# Patient Record
Sex: FEMALE
[De-identification: names, ages, dates, MRNs, and addresses within clinical notes are randomized per-mention and may not be internally consistent; named-entity substitution may affect disease eponyms.]

---

## 2017-02-28 ENCOUNTER — HOSPITAL ENCOUNTER (OUTPATIENT)
Dept: HOSPITAL 53 - M LAB REF | Age: 82
End: 2017-02-28
Attending: INTERNAL MEDICINE
Payer: MEDICARE

## 2017-02-28 DIAGNOSIS — G43.909: ICD-10-CM

## 2017-02-28 DIAGNOSIS — G20: Primary | ICD-10-CM

## 2017-08-01 ENCOUNTER — HOSPITAL ENCOUNTER (OUTPATIENT)
Dept: HOSPITAL 53 - M LAB REF | Age: 82
End: 2017-08-01
Attending: INTERNAL MEDICINE
Payer: MEDICARE

## 2017-08-01 DIAGNOSIS — G20: Primary | ICD-10-CM

## 2017-08-04 LAB — PHENOBARBITAL (PRIMIDONE): 22 UG/ML (ref 15–40)

## 2017-12-06 ENCOUNTER — HOSPITAL ENCOUNTER (INPATIENT)
Dept: HOSPITAL 53 - M ED | Age: 82
LOS: 16 days | Discharge: NURSING, REHAB OR LONG-TERM CARE | DRG: 291 | End: 2017-12-22
Attending: INTERNAL MEDICINE | Admitting: HOSPITALIST
Payer: MEDICARE

## 2017-12-06 VITALS — DIASTOLIC BLOOD PRESSURE: 60 MMHG | SYSTOLIC BLOOD PRESSURE: 117 MMHG

## 2017-12-06 VITALS — BODY MASS INDEX: 37.58 KG/M2 | WEIGHT: 239.42 LBS | HEIGHT: 67 IN

## 2017-12-06 DIAGNOSIS — Z87.891: ICD-10-CM

## 2017-12-06 DIAGNOSIS — N39.0: ICD-10-CM

## 2017-12-06 DIAGNOSIS — G20: ICD-10-CM

## 2017-12-06 DIAGNOSIS — I08.0: ICD-10-CM

## 2017-12-06 DIAGNOSIS — A41.9: ICD-10-CM

## 2017-12-06 DIAGNOSIS — I50.33: Primary | ICD-10-CM

## 2017-12-06 DIAGNOSIS — M06.9: ICD-10-CM

## 2017-12-06 DIAGNOSIS — R53.1: ICD-10-CM

## 2017-12-06 DIAGNOSIS — Z79.899: ICD-10-CM

## 2017-12-06 DIAGNOSIS — Z79.52: ICD-10-CM

## 2017-12-06 DIAGNOSIS — M35.3: ICD-10-CM

## 2017-12-06 DIAGNOSIS — E78.5: ICD-10-CM

## 2017-12-06 DIAGNOSIS — K21.9: ICD-10-CM

## 2017-12-06 DIAGNOSIS — D72.829: ICD-10-CM

## 2017-12-06 DIAGNOSIS — E03.9: ICD-10-CM

## 2017-12-06 DIAGNOSIS — I48.91: ICD-10-CM

## 2017-12-06 DIAGNOSIS — J18.9: ICD-10-CM

## 2017-12-06 DIAGNOSIS — Z86.711: ICD-10-CM

## 2017-12-06 DIAGNOSIS — Z79.82: ICD-10-CM

## 2017-12-06 DIAGNOSIS — E87.2: ICD-10-CM

## 2017-12-06 DIAGNOSIS — I82.412: ICD-10-CM

## 2017-12-06 DIAGNOSIS — F32.9: ICD-10-CM

## 2017-12-06 DIAGNOSIS — R31.9: ICD-10-CM

## 2017-12-06 LAB
ANION GAP SERPL CALC-SCNC: 7 MEQ/L (ref 8–16)
BASE EXCESS BLDA CALC-SCNC: 4.6 MMOL/L (ref -2–2)
BASOPHILS # BLD AUTO: 0 10^3/UL (ref 0–0.2)
BASOPHILS NFR BLD AUTO: 0.3 % (ref 0–1)
BUN SERPL-MCNC: 25 MG/DL (ref 7–18)
CALCIUM SERPL-MCNC: 9 MG/DL (ref 8.8–10.2)
CHLORIDE SERPL-SCNC: 100 MEQ/L (ref 98–107)
CO2 BLDA CALC-SCNC: 27.9 MEQ/L (ref 23–31)
CO2 SERPL-SCNC: 34 MEQ/L (ref 21–32)
CREAT SERPL-MCNC: 1.07 MG/DL (ref 0.55–1.02)
EOSINOPHIL # BLD AUTO: 0 10^3/UL (ref 0–0.5)
EOSINOPHIL NFR BLD AUTO: 0.3 % (ref 0–3)
ERYTHROCYTE [DISTWIDTH] IN BLOOD BY AUTOMATED COUNT: 15.2 % (ref 11.5–14.5)
GFR SERPL CREATININE-BSD FRML MDRD: 51.6 ML/MIN/{1.73_M2} (ref 32–?)
GLUCOSE SERPL-MCNC: 105 MG/DL (ref 83–110)
HCO3 BLDA-SCNC: 26.9 MEQ/L (ref 22–26)
HCO3 STD BLDA-SCNC: 28.6 MEQ/L (ref 22–26)
IMM GRANULOCYTES NFR BLD: 0.4 % (ref 0–0)
INR PPP: 0.98
LYMPHOCYTES # BLD AUTO: 1.9 10^3/UL (ref 1.5–4.5)
LYMPHOCYTES NFR BLD AUTO: 24.3 % (ref 24–44)
MCH RBC QN AUTO: 36.1 PG (ref 27–33)
MCHC RBC AUTO-ENTMCNC: 34.6 G/DL (ref 32–36.5)
MCV RBC AUTO: 104.2 FL (ref 80–96)
MONOCYTES # BLD AUTO: 0.7 10^3/UL (ref 0–0.8)
MONOCYTES NFR BLD AUTO: 9.4 % (ref 0–5)
NEUTROPHILS # BLD AUTO: 5.1 10^3/UL (ref 1.8–7.7)
NEUTROPHILS NFR BLD AUTO: 65.3 % (ref 36–66)
NRBC BLD AUTO-RTO: 0 % (ref 0–0)
PCO2 BLDA: 33.1 MMHG (ref 35–45)
PH BLDA: 7.53 UNITS (ref 7.35–7.45)
PLATELET # BLD AUTO: 203 10^3/UL (ref 150–450)
PO2 BLDA: 77 MMHG (ref 75–100)
POTASSIUM SERPL-SCNC: 3.4 MEQ/L (ref 3.5–5.1)
SODIUM SERPL-SCNC: 141 MEQ/L (ref 136–145)
WBC # BLD AUTO: 7.9 10^3/UL (ref 4–10)

## 2017-12-06 RX ADMIN — FOLIC ACID SCH MG: 1 TABLET ORAL at 21:29

## 2017-12-06 RX ADMIN — FUROSEMIDE SCH MG: 10 INJECTION, SOLUTION INTRAMUSCULAR; INTRAVENOUS at 23:48

## 2017-12-06 RX ADMIN — PRIMIDONE SCH MG: 50 TABLET ORAL at 21:27

## 2017-12-06 RX ADMIN — CARBIDOPA AND LEVODOPA SCH TAB: 25; 100 TABLET ORAL at 21:27

## 2017-12-06 RX ADMIN — OXYBUTYNIN CHLORIDE SCH MG: 5 TABLET, EXTENDED RELEASE ORAL at 09:00

## 2017-12-06 RX ADMIN — CARBIDOPA AND LEVODOPA SCH TAB: 25; 100 TABLET ORAL at 21:00

## 2017-12-06 RX ADMIN — DOCUSATE SODIUM SCH MG: 100 CAPSULE, LIQUID FILLED ORAL at 21:30

## 2017-12-06 RX ADMIN — OMEPRAZOLE SCH MG: 20 CAPSULE, DELAYED RELEASE ORAL at 21:29

## 2017-12-06 RX ADMIN — MULTIPLE VITAMINS W/ MINERALS TAB SCH TAB: TAB at 21:25

## 2017-12-06 RX ADMIN — METOPROLOL SUCCINATE SCH MG: 25 TABLET, EXTENDED RELEASE ORAL at 21:29

## 2017-12-06 RX ADMIN — POTASSIUM CHLORIDE SCH MEQ: 750 TABLET, EXTENDED RELEASE ORAL at 21:30

## 2017-12-06 RX ADMIN — ATORVASTATIN CALCIUM SCH MG: 10 TABLET, FILM COATED ORAL at 21:27

## 2017-12-06 RX ADMIN — DIVALPROEX SODIUM SCH MG: 500 TABLET, FILM COATED, EXTENDED RELEASE ORAL at 21:28

## 2017-12-06 NOTE — ECGEPIP
Stationary ECG Study

                           [facility] - ED

                                       

                                       Test Date:    2017

Pat Name:     MINA VERA      Department:   

Patient ID:   [id number]                 Room:         -

Gender:       F                        Technician:   erick

:          1930               Requested By: DARRION HEART

Order Number: [id number]     Reading MD:   Ariel Chery

                                 Measurements

Intervals                              Axis          

Rate:         111                      P:            

OK:           0                        QRS:          -50

QRSD:         92                       T:            138

QT:           328                                    

QTc:          446                                    

                           Interpretive Statements

ATRIAL FIBRILLATION WITH RAPID VENTRICULAR RESPONSE

LEFT AXIS DEVIATION

LOW QRS VOLTAGE IN PRECORDIAL LEADS

NONSPECIFIC ST & T-WAVE ABNORMALITY

RHYTHM CHANGE COMPARED TO 6/3/14

 

Electronically Signed On 2017 19:02:22 EST by Ariel Chery

## 2017-12-06 NOTE — REP
CT angiogram chest:  12/05/2017

 

Comparison: 06/03/2014 CT angiogram, portable chest 12/06/2017.

 

Clinical history:  Dyspnea.

 

Technique:  The patient received a bolus of 75 mL Isovue 370 scanning through the

abdomen and pelvis with pulmonary angiogram protocol and with thick slab MIP

reformatting in coronal and sagittal reconstructions.

 

Findings:  Lung fields are adequately inflated.  There is some underlying

interstitial fibrotic change and minimal dependent atelectatic changes deep

sulcus lower lobes, left more than right and also along the major fissure in the

left mid lower lung zone.  There is curvilinear fibrotic change in the medial

segment of the right middle lobe and inferior lingular segment of the left upper

lobe.  No effusion or acute infiltrate.  There is very mild cylindrical

bronchiectatic change noted.  Heart is enlarged with left atrial and ventricular

enlargement.  There is no pericardial thickening or effusion and no hiatal

hernia.  There appears to be some thickening of the esophageal wall in the mid to

lower esophagus.  The abdominal and visible thoracic aorta included in the study

are without aneurysm or dissection.  There are atherosclerotic calcifications at

the arch.  The main, right and left pulmonary arteries and the mediastinum are

without filling defects.  The bilateral lobar and visible segmental arteries and

the subsegmental arteries included are also without filling defect or vessel

cutoff to suggest pulmonary emboli. There is  no pathologic mediastinal or hilar

adenopathy.  No axillary or supraclavicular mass.  Bones show the sternum,

manubrium, medial clavicles, the left AC joint, visualized small portions of the

left glenohumeral joint and the entire right as well, scapulae and ribs were

intact and without fracture or focal lesion.  There is a dextrorotatory curve

thoracolumbar junction with diffuse degenerative changes throughout the spine

without compression deformity or destructive lesion.  The upper abdomen that

portion of liver included was unremarkable.  That portion of spleen noted is

unremarkable and intact.  The small portions of the renal apices included were

intact and unremarkable.

 

Impression:

 

1.  There is no CT evidence of pulmonary thromboembolism.

 

2.  There is dependent atelectatic changes posteriorly in the lower lung zones

and some dependent atelectasis also along the major fissure on the left with

curvilinear fibroatelectatic change inferior lingular segment and medial segment

of the right middle lobe at the anterior lung base.  No effusion, dense

consolidation or parenchymal mass.

 

3.  No aortic aneurysm or dissection.  There are degenerative changes in the

spine.  Shoulders without destructive lesion or fracture.  No other significant

finding.

 

 

Signed by

Bill Fletcher MD 12/06/2017 07:44 P

## 2017-12-06 NOTE — REP
BILATERAL LOWER EXTREMITY DOPPLER VENOUS ULTRASOUND:  12/06/2017.

 

Clinical history:  Lower extremity edema.  Evaluate for DVT.  Prior history of

PE.  Not on anticoagulation.

 

Findings:  No comparison study.

 

Standard duplex techniques were utilized to evaluate the deep venous system from

the groin to the popliteal fossa.

 

On the left side there is nonocclusive thrombus in the femoral vein from the

takeoff of the profunda femoris to the popliteal vein in the knee.  There is

still respiratory variation and augmented flow.  Popliteal vein diminishes from

7.4 mm to 4.5 mm with compression.  Likewise the femoral vein from its distal to

its proximal course shows incomplete compression.  The common femoral vein was

unremarkable.

 

The right lower extremity shows full compressibility throughout its course with

respiratory variation, augmented flow and standard duplex Doppler

interrogation/color imaging.

 

Impression:

 

1.  Nonocclusive DVT in the left lower extremity from the takeoff of the profunda

femoris, through the course of the femoral vein in the thigh to the popliteal

vein.

 

2.  No DVT in the right lower extremity.

 

 

Signed by

Bill Fletcher MD 12/06/2017 07:41 P

## 2017-12-06 NOTE — HPEPDOC
[facility] Medical History & Physical


Date of Admission


Dec 6, 2017


Primary Care Physician:  ADAIR TOBIAS DO


Attending Physician:  SHAHRZAD POTTER MD





History and Physical


CHIEF COMPLAINT: shortness of breath and weakness





HISTORY OF PRESENT ILLNESS: [87-year-old female patient of Dr. Tobias presents 

to the emergency department with increasing weakness, shortness of breath, 

nonproductive cough for the last 10 days or so. She was found at home down on 

the floor for several hours by her family members. She felt that she had been 

down on the floor for approximately 9 hours and her life alert necklace had not 

been operating correctly. Her 2 daughters who are accompanying her at the 

emergency department today feel that she has had a  "wet sounding cough" for 

the last 10 days with little to no improvement. And they've noticed that she's 

had some lower extremity edema, which they feel is more than she normally has.





Dilation to the emergency department did not demonstrate any hypoxia. Her blood 

pressure was felt to be a little oh for her initially. She complained of some 

leg pain and was found to have a nonocclusive DVT on ultrasound and CT 

angiogram of the chest was negative for pulmonary embolism.





Nonetheless, she was felt to have an elevated proBNP and signs and symptoms of 

heart failure combined with her generalized weakness. As not felt to be safe to 

be discharged home. Hospitalist was contacted for admission.]





PAST MEDICAL HISTORY:


Atrial fibrillation


History of chronic diastolic heart failure. Last 2-D echoes in 2014


History of mitral valve and aortic valve regurgitation


Polymyalgia.


Hypothyroidism.


History of symptomatic ventricular ectopy.


History of parkinsonism.


Prior history of angioedema 1997, unknown cause.


Hyperlipidemia.


Rheumatoid arthritis


Prior history of pulmonary embolism, previously on Coumadin for anticoagulation 

resulting in a severe bleeding complication





PAST SURGICAL HISTORY:


Transvaginal hysterectomy.


Brain tumor with resection 1997 reported to be meningioma





SOCIAL HISTORY:


. Lives at home alone.


Prior history of smoking approximately one pack a day but quit smoking in 1999.


Denies alcohol use. No prior history of IV drug use.


Denies recent travel, no sick contacts





FAMILY HISTORY:


Noncontributory





ALLERGIES: Please see below.





REVIEW OF SYSTEMS:





CONSTITUTIONAL: No fever, chills, weight loss, nausea or vomiting  .


HEENT: No headache, lightheadedness, blurred or loss of vision. No difficulty 

with speech or swallow.


CARDIOVASCULAR: Positive orthopnea, paroxysmal nocturnal dyspnea and lower 

extremity edema. No chest pain, palpitations, 


RESPIRATORY: Nonproductive cough with wheeze, but no hemoptysis


GENITOURINARY: No dysuria, frequency, or discharge


MUSCULOSKELETAL: No bone, muscle or joint pain.


GASTROINTESTINAL: No Nausea, vomiting, change in appetite. Bowel movements are 

regular without hematochezia or melena. No bladder or bowel incontinence.


SKIN: No complaint of lesions, abrasions or rashes


NEUROLOGICAL: No blurred vision, headaches, paraesthesias or paralysis


PSYCHIATRIC: No depression, anxiety, audiovisual hallucinations. No suicidal 

ideations.


ENDOCRINE: Denies history of diabetes or thyroid disorder. No history of 

endocrine abnormalities.


HEMATOLOGIC/ONCOLOGY: prior history of VTE (PE), had a bleeding complication on 

coumadin.


LYMPHATIC: No lumps, bumps or swelling of neck, axilla or groin. No night 

sweats or weight loss.





HOME MEDICATIONS: Please see below. 





PHYSICAL EXAMINATION:


VITAL SIGNS: Please see below. 


GENERAL: NAD, A&OX3, Pleasant 


HEENT: PERRLA, throat clear, neck supple, unable to determine JVD


CARDIOVASCULAR: Irregular irregular


RESPIRATORY: Diminished bibasilar breath sounds with expiratory wheeze. 

Occasional Rales


ABDOMINAL: soft, NT/ND normoactive bowel sounds


EXTREMITIES: 1+ edema, no calf tenderness


NEUROLOGICAL: CN'S II-XII grossly intact


PSYCHOLOGICAL: negative


LABORATORY DATA: See below.





IMAGING: [


Extremities vascular ultrasound: Nonocclusive DVT of the left lower extremity 

from the profunda femoris through the femoral vein of the thigh to the 

popliteal vein]





CT angiogram of the chest:


1.  There is no CT evidence of pulmonary thromboembolism.


2.  There is dependent atelectatic changes posteriorly in the lower lung zones


and some dependent atelectasis also along the major fissure on the left with


curvilinear fibroatelectatic change inferior lingular segment and medial segment


of the right middle lobe at the anterior lung base.  No effusion, dense


consolidation or parenchymal mass.


3.  No aortic aneurysm or dissection.  There are degenerative changes in the


spine.  Shoulders without destructive lesion or fracture.  No other significant


finding.





Chest x-ray: Cardiomegaly with vascular cephalization but no pleural effusion 

noted.





Twelve-lead EKG atrial fibrillation with ventricular rate of 111, no specific ST

-T wave abnormality, no significant change from prior EKG.


 


MICROBIOLOGY: Please see below. 





IMPRESSION: 87-year-old female will need to be admitted for increased 

generalized weakness. She appears to be grossly volume overloaded at this point 

and will need further diuresing. She also appears to have some issues with 

physical deconditioning. Will need ongoing physical therapy and possibly 

nursing home placement.





PROBLEM LIST: 


1. Increased dyspnea, number next.


2. Weakness.


3. Rather large nonocclusive left lower extremity DVT


4. She was found down at home with no physical signs of trauma.


5. Congestive heart failure with diastolic dysfunction.(likely decompensated)


6. Lactic acidosis with no signs of infection or sepsis. This is likely related 

to muscle irritation from immobilization on the floor as indicated in the HPI.


7. Atrial fibrillation with mild RVR, possibly related to volume overload.


8. Parkinson's disease with physical deconditioning





PLAN: 


Admit MedSurg on telemetry. We'll continue with home meds (holding torsemide). 


Negative Lasix. Fluid restrict 1800 mL per day. 


I did start her on weight-based Lovenox. Regarding the nonocclusive lower 

extremity DVT. In further discussions with the family will need to be 

determined in the morning for possible IVC plate placement. The family is 

concerned that she is not a good candidate for oral anticoagulation therapy.


We'll continue on her home beta blocker for rapid ventricular rate. I'd 

anticipate her RVR will likely improve once her fluid status is improved.


PT, OT consults were placed. (She may be looking at nursing home vs. subacute 

rehab.


Her last echocardiogram was in 2014. We'll order a echo at this time for 

reevaluation.





DVT PROPHYLAXIS: Weight-based Lovenox





DISPOSITION: Likely greater than 2 midnights and will likely need nursing home 

placement








Vital Signs





Vital Signs








  Date Time  Temp Pulse Resp B/P (MAP) Pulse Ox O2 Delivery O2 Flow Rate FiO2


 


12/6/17 14:38    143/67 (92)    


 


12/6/17 14:30  119      


 


12/6/17 14:15     97   


 


12/6/17 11:46       2.0 


 


12/6/17 11:28 98.2  20   Room Air  











Laboratory Data


Labs 24H


Laboratory Tests 2


12/6/17 11:44: 


Immature Granulocyte % (Auto) 0.4H, White Blood Count 7.9, Red Blood Count 4.02

, Hemoglobin 14.5, Hematocrit 41.9, Mean Corpuscular Volume 104.2H, Mean 

Corpuscular Hemoglobin 36.1H, Mean Corpuscular Hemoglobin Concent 34.6, Red 

Cell Distribution Width 15.2H, Platelet Count 203, Neutrophils (%) (Auto) 65.3, 

Lymphocytes (%) (Auto) 24.3, Monocytes (%) (Auto) 9.4H, Eosinophils (%) (Auto) 

0.3, Basophils (%) (Auto) 0.3, Neutrophils # (Auto) 5.1, Lymphocytes # (Auto) 

1.9, Monocytes # (Auto) 0.7, Eosinophils # (Auto) 0.0, Basophils # (Auto) 0.0, 

Immature Granulocyte # (Auto) 0.0, Nucleated Red Blood Cells % (auto) 0.0, 

Anion Gap 7L, Glomerular Filtration Rate 51.6, Lactic Acid Level 2.4*H, Blood 

Urea Nitrogen 25H, Creatinine 1.07H, Sodium Level 141, Potassium Level 3.4L, 

Chloride Level 100, Carbon Dioxide Level 34H, Calcium Level 9.0, Total Creatine 

Kinase 121, Creatine Kinase MB 1.9, Creatine Kinase MB Relative Index 1.57, 

Troponin I < 0.02


12/6/17 11:48: 


Prothrombin Time 13.1, Prothromb Time International Ratio 0.98, Activated 

Partial Thromboplast Time 31.9, NT-Pro-B-Type Natriuretic Peptide 5468H, 

Thyroid Stimulating Hormone (TSH) 16.500H, Valproic Acid (Depakene) Level 27.6L


12/6/17 11:54: 


Blood Gas Bicarbonate Standard 28.6H, Arterial Blood pH 7.528H, Arterial Blood 

Partial Pressure CO2 33.1L, Arterial Blood Partial Pressure O2 77.0, Arterial 

Blood Total CO2 27.9, Arterial Blood HCO3 26.9H, Arterial Blood Base Excess 4.6H

, Arterial Blood Oxygen Saturation 96.0


12/6/17 16:34: 


12/6/17 16:36: Lactic Acid Followup at 4 Hours 1.9


CBC/BMP


Laboratory Tests


12/6/17 11:44








Red Blood Count 4.02, Mean Corpuscular Volume 104.2 H, Mean Corpuscular 

Hemoglobin 36.1 H, Mean Corpuscular Hemoglobin Concent 34.6, Red Cell 

Distribution Width 15.2 H, Neutrophils (%) (Auto) 65.3, Lymphocytes (%) (Auto) 

24.3, Monocytes (%) (Auto) 9.4 H, Eosinophils (%) (Auto) 0.3, Basophils (%) (

Auto) 0.3, Neutrophils # (Auto) 5.1, Lymphocytes # (Auto) 1.9, Monocytes # (Auto

) 0.7, Eosinophils # (Auto) 0.0, Basophils # (Auto) 0.0, Calcium Level 9.0, 

Total Creatine Kinase 121


Microbiology





Microbiology


12/6/17 Blood Culture, Received


          Pending


12/6/17 Blood Culture, Received


          Pending





Home Medications


Scheduled


Aspirin (Aspirin EC) 325 Mg Tabec, 325 MG PO DAILY


Atorvastatin Calcium (Lipitor) 10 Mg Tab, 10 MG PO QHS


Carbidopa/Levodopa (Sinemet  mg) 1 Tab Tab, 1.5 TAB PO QID


   WITH MEALS AND AT BEDTIME 


Divalproex Sodium (Divalproex Sodium ER) 500 Mg Tab, 500 MG PO BID


Ferrous Sulfate (Ferrous Sulfate) 325 Mg Tab, 325 MG PO DAILY


Folic Acid (Folic Acid) 1 Mg Tab, 1 MG PO QHS


Levothyroxine Sodium (Levoxyl) 150 Mcg Tab, 150 MCG PO DAILY


Loratadine (Loratadine) 10 Mg Tab, 10 MG PO DAILY


Methotrexate (Methotrexate) 2.5 Mg Tab, 12.5 MG PO ASDIRECTED


   ONCE WEEKLY ON WEDNESDAYS 


Metoprolol Succinate (Metoprolol Succinate ER) 25 Mg Tab, 25 MG PO BID


Multivitamins (Centrum Silver) 1 Tab Tab, 1 TAB PO DAILY


Omeprazole (Prilosec) 20 Mg Cap, 20 MG PO BID


Oxybutynin Chloride (Ditropan) 5 Mg Tab, 5 MG PO DAILY


Paroxetine (Paxil) 20 Mg Tab, 20 MG PO DAILY


Potassium Chloride (Potassium Chloride ER) 10 Meq Tab, 10 MEQ PO BID


Prednisone (Prednisone) 1 Mg Tab, 1 MG PO BID


Primidone (Mysoline) 50 Mg Tab, 200 MG PO DAILY


Primidone (Primidone) 50 Mg Tab, 150 MG PO QHS


Torsemide (Torsemide) 20 Mg Tab, 40 MG PO BID





Scheduled PRN


Carboxymethylcellulose Sodium (Refresh Tears) 0.5 % Gallo, 1 DROP OU PRN PRN for 

DRY EYES


Levalbuterol Tartrate (Xopenex Hfa) 45 Mcg/Act Aer, 1 PUFF INH QID PRN for 

SHORTNESS OF BREATH





Allergies


Coded Allergies:  


     No Known Drug Allergy (Unverified  Allergy, Unknown, 4/5/13)











YANY CLARK DO Dec 6, 2017 17:26

## 2017-12-06 NOTE — REP
Chest two views

 

HISTORY: Cough

 

Comparison: 06/03/2014

 

The lungs are clear.  The cardiac silhouette is enlarged.  The pulmonary

vasculature is normal in appearance.  The bony structure is intact.

 

IMPRESSION: Cardiomegaly.

 

 

Signed by

Sarwat Serna MD 12/06/2017 12:25 P

## 2017-12-07 VITALS — SYSTOLIC BLOOD PRESSURE: 129 MMHG | DIASTOLIC BLOOD PRESSURE: 60 MMHG

## 2017-12-07 VITALS — SYSTOLIC BLOOD PRESSURE: 123 MMHG | DIASTOLIC BLOOD PRESSURE: 60 MMHG

## 2017-12-07 VITALS — SYSTOLIC BLOOD PRESSURE: 121 MMHG | DIASTOLIC BLOOD PRESSURE: 76 MMHG

## 2017-12-07 VITALS — DIASTOLIC BLOOD PRESSURE: 76 MMHG | SYSTOLIC BLOOD PRESSURE: 121 MMHG

## 2017-12-07 VITALS — DIASTOLIC BLOOD PRESSURE: 56 MMHG | SYSTOLIC BLOOD PRESSURE: 112 MMHG

## 2017-12-07 VITALS — DIASTOLIC BLOOD PRESSURE: 87 MMHG | SYSTOLIC BLOOD PRESSURE: 116 MMHG

## 2017-12-07 VITALS — SYSTOLIC BLOOD PRESSURE: 114 MMHG | DIASTOLIC BLOOD PRESSURE: 68 MMHG

## 2017-12-07 LAB
ANION GAP SERPL CALC-SCNC: 10 MEQ/L (ref 8–16)
BUN SERPL-MCNC: 18 MG/DL (ref 7–18)
CALCIUM SERPL-MCNC: 8.2 MG/DL (ref 8.8–10.2)
CHLORIDE SERPL-SCNC: 103 MEQ/L (ref 98–107)
CO2 SERPL-SCNC: 30 MEQ/L (ref 21–32)
CREAT SERPL-MCNC: 0.9 MG/DL (ref 0.55–1.02)
ERYTHROCYTE [DISTWIDTH] IN BLOOD BY AUTOMATED COUNT: 15.1 % (ref 11.5–14.5)
GFR SERPL CREATININE-BSD FRML MDRD: > 60 ML/MIN/{1.73_M2} (ref 32–?)
GLUCOSE SERPL-MCNC: 132 MG/DL (ref 83–110)
MCH RBC QN AUTO: 35.3 PG (ref 27–33)
MCHC RBC AUTO-ENTMCNC: 33.8 G/DL (ref 32–36.5)
MCV RBC AUTO: 104.6 FL (ref 80–96)
NRBC BLD AUTO-RTO: 0 % (ref 0–0)
PLATELET # BLD AUTO: 204 10^3/UL (ref 150–450)
POTASSIUM SERPL-SCNC: 3.5 MEQ/L (ref 3.5–5.1)
SODIUM SERPL-SCNC: 143 MEQ/L (ref 136–145)
WBC # BLD AUTO: 5.8 10^3/UL (ref 4–10)

## 2017-12-07 RX ADMIN — OXYBUTYNIN CHLORIDE SCH MG: 5 TABLET, EXTENDED RELEASE ORAL at 10:21

## 2017-12-07 RX ADMIN — CARBIDOPA AND LEVODOPA SCH TAB: 25; 100 TABLET ORAL at 13:34

## 2017-12-07 RX ADMIN — OMEPRAZOLE SCH MG: 20 CAPSULE, DELAYED RELEASE ORAL at 20:53

## 2017-12-07 RX ADMIN — LORATADINE SCH MG: 10 TABLET ORAL at 10:22

## 2017-12-07 RX ADMIN — CARBIDOPA AND LEVODOPA SCH TAB: 25; 100 TABLET ORAL at 10:22

## 2017-12-07 RX ADMIN — FUROSEMIDE SCH MG: 10 INJECTION, SOLUTION INTRAMUSCULAR; INTRAVENOUS at 17:50

## 2017-12-07 RX ADMIN — OMEPRAZOLE SCH MG: 20 CAPSULE, DELAYED RELEASE ORAL at 10:21

## 2017-12-07 RX ADMIN — FERROUS SULFATE TAB 325 MG (65 MG ELEMENTAL FE) SCH MG: 325 (65 FE) TAB at 10:21

## 2017-12-07 RX ADMIN — PRIMIDONE SCH MG: 50 TABLET ORAL at 10:20

## 2017-12-07 RX ADMIN — PRIMIDONE SCH MG: 50 TABLET ORAL at 20:53

## 2017-12-07 RX ADMIN — CARBIDOPA AND LEVODOPA SCH TAB: 25; 100 TABLET ORAL at 17:50

## 2017-12-07 RX ADMIN — DIVALPROEX SODIUM SCH MG: 500 TABLET, FILM COATED, EXTENDED RELEASE ORAL at 20:52

## 2017-12-07 RX ADMIN — APIXABAN SCH MG: 2.5 TABLET, FILM COATED ORAL at 10:29

## 2017-12-07 RX ADMIN — MULTIPLE VITAMINS W/ MINERALS TAB SCH TAB: TAB at 10:23

## 2017-12-07 RX ADMIN — FOLIC ACID SCH MG: 1 TABLET ORAL at 20:54

## 2017-12-07 RX ADMIN — FUROSEMIDE SCH MG: 10 INJECTION, SOLUTION INTRAMUSCULAR; INTRAVENOUS at 12:39

## 2017-12-07 RX ADMIN — METOPROLOL SUCCINATE SCH MG: 25 TABLET, EXTENDED RELEASE ORAL at 20:55

## 2017-12-07 RX ADMIN — FUROSEMIDE SCH MG: 10 INJECTION, SOLUTION INTRAMUSCULAR; INTRAVENOUS at 05:34

## 2017-12-07 RX ADMIN — ATORVASTATIN CALCIUM SCH MG: 10 TABLET, FILM COATED ORAL at 20:52

## 2017-12-07 RX ADMIN — DOCUSATE SODIUM SCH MG: 100 CAPSULE, LIQUID FILLED ORAL at 10:21

## 2017-12-07 RX ADMIN — DIVALPROEX SODIUM SCH MG: 500 TABLET, FILM COATED, EXTENDED RELEASE ORAL at 10:21

## 2017-12-07 RX ADMIN — LEVOTHYROXINE SODIUM SCH MCG: 150 TABLET ORAL at 05:34

## 2017-12-07 RX ADMIN — ASPIRIN SCH MG: 325 TABLET, COATED ORAL at 10:21

## 2017-12-07 RX ADMIN — POTASSIUM CHLORIDE SCH MEQ: 750 TABLET, EXTENDED RELEASE ORAL at 10:22

## 2017-12-07 RX ADMIN — POTASSIUM CHLORIDE SCH MEQ: 750 TABLET, EXTENDED RELEASE ORAL at 20:52

## 2017-12-07 RX ADMIN — METOPROLOL SUCCINATE SCH MG: 25 TABLET, EXTENDED RELEASE ORAL at 10:22

## 2017-12-07 RX ADMIN — DOCUSATE SODIUM SCH MG: 100 CAPSULE, LIQUID FILLED ORAL at 20:54

## 2017-12-07 RX ADMIN — APIXABAN SCH MG: 2.5 TABLET, FILM COATED ORAL at 20:54

## 2017-12-07 RX ADMIN — CARBIDOPA AND LEVODOPA SCH TAB: 25; 100 TABLET ORAL at 20:54

## 2017-12-07 NOTE — ECHO
DATE OF PROCEDURE: 12/07/2017

 

REFERRING PHYSICIAN: Dr. Rios Valente.

 

INDICATION: Congestive heart failure.

 

HEIGHT: 170 cm

WEIGHT: 111 kg

 

DIMENSIONS:

IVS: 1.2

LV 4.1

LVPW: 1.1

LA 4.2

Aorta 3.4.

 

FINDINGS: Study is of very limited technical quality with limited visualization.

Left ventricle is of normal size and grossly normal systolic function. I

certainly cannot rule out subtle wall motion abnormalities.  Right ventricle was

poorly seen but grossly appears normal.  Both atria are at least mildly 
enlarged.

Aortic valve appears normal.  Mitral valve also appears normal based on limited

views.  Tricuspid valve was poorly seen but no gross abnormalities noted.

Pulmonic valve was not visualized.  No pericardial effusion is present.  
Inferior

vena cava was not seen.  Aortic root is normal.  Aortic arch and abdominal aorta

were not seen.  There is a pericardial fat pad noted.

 

Doppler interrogation reveals no aortic stenosis and trace aortic insufficiency.

There is trace mitral insufficiency and trace tricuspid insufficiency.

Unfortunately quality of TR jet was not sufficient to adequately estimate

pulmonary artery pressure.

 

Evaluation of diastolic function is inconclusive due to underlying atrial

fibrillation with mildly tachycardic response.

 

CONCLUSIONS:

1.  Study is of very limited technical quality.

2.  Normal LV size with borderline LVH and grossly preserved LV systolic

function.

3.  No significant valvular disease.

4.  Unable to estimate central venous pressure and pulmonary artery pressure.

 

COMMENT: Subacute bacterial endocarditis (SBE) prophylaxis not recommended.

MTDD

## 2017-12-07 NOTE — IPNPDOC
Date Seen


The patient was seen on 12/7/17.





Progress Note


SUBJECTIVE: Patient is a 86 yo female, admitted for dyspnea and weakness felt 

to be related to volume overload and underlying history of CHF.


No overnight issues. Feels breathing is better. Denies: F/C/R, N/V/D, CP.





OBJECTIVE


PHYSICAL EXAMINATION:


VITAL SIGNS: Please see below. 


GENERAL: NAD, A&OX3, Pleasant 


HEENT: PERRLA, throat clear, neck supple,unable to evaluate JVD


CARDIOVASCULAR: irregular irregular


RESPIRATORY: rales and expiratory wheeze noted bilaterally


ABDOMINAL: soft, NT/ND normoactive bowel sounds


EXTREMITIES: 1+ edema/no calf tenderness


NEUROLOGICAL: CN'S II-XII grossly intact


PSYCHOLOGICAL: negative





LABORATORY DATA: Please see below.





MICROBIOLOGY: Please see below.





Echocardiogram: pending.





DVT prophylaxis ordered?: yes / Eliquis 





ASSESSMENT AND PLAN: This is a -year-old RACE GENDER with .





PROBLEMS:


1. Increased dyspnea on admission: 


Subjectively better, likely related to decompensated CHF. 


Continue to diurese.


2. Weakness: 


Overall deconditioned.


PT/OT eval and treatment pending.


3. Rather large nonocclusive left lower extremity DVT:


Patient and family concerned about prior issue with coumadin but willing to try 

Eliquis.


Will need treatment for at least 3 months and defer further anticoagulation to 

Dr. Parra (PCP)


4. She was found down at home with no physical signs of trauma:


Continue with PT/OT.


5. Congestive heart failure with diastolic dysfunction.(likely decompensated):


Fluid restriction to 1800cc/day


Net negative lasix to 2L/day


Will try to titrate O2 supplementation.


6. Lactic acidosis with no signs of infection or sepsis: 


Resolved.


7. Atrial fibrillation with mild RVR, possibly related to volume overload:


Rate is better controlled with diuresis. 


Discussed [id number] score of 6 and need for anticoagulation.


Agreeable to trial of Eliquis while being treated for DVT.


Will defer further discussion to PCP at outpatient follow up.


8. Parkinson's disease with physical deconditioning: 


PT/OT and PFS consult 





DISPOSITION: continue to diurese, needs to work with PT/OT and look at possible 

rehab versus placement..





VS, I&O, 24H, Fishbone


Vital Signs/I&O





Vital Signs








  Date Time  Temp Pulse Resp B/P (MAP) Pulse Ox O2 Delivery O2 Flow Rate FiO2


 


12/7/17 07:16       3.0 


 


12/7/17 06:00 97.3 56 19 123/60 (81) 94 Nasal Cannula  











Laboratory Data


24H LABS


Laboratory Tests 2


12/6/17 11:44: 


Immature Granulocyte % (Auto) 0.4H, White Blood Count 7.9, Red Blood Count 4.02

, Hemoglobin 14.5, Hematocrit 41.9, Mean Corpuscular Volume 104.2H, Mean 

Corpuscular Hemoglobin 36.1H, Mean Corpuscular Hemoglobin Concent 34.6, Red 

Cell Distribution Width 15.2H, Platelet Count 203, Neutrophils (%) (Auto) 65.3, 

Lymphocytes (%) (Auto) 24.3, Monocytes (%) (Auto) 9.4H, Eosinophils (%) (Auto) 

0.3, Basophils (%) (Auto) 0.3, Neutrophils # (Auto) 5.1, Lymphocytes # (Auto) 

1.9, Monocytes # (Auto) 0.7, Eosinophils # (Auto) 0.0, Basophils # (Auto) 0.0, 

Immature Granulocyte # (Auto) 0.0, Nucleated Red Blood Cells % (auto) 0.0, 

Anion Gap 7L, Glomerular Filtration Rate 51.6, Lactic Acid Level 2.4*H, Blood 

Urea Nitrogen 25H, Creatinine 1.07H, Sodium Level 141, Potassium Level 3.4L, 

Chloride Level 100, Carbon Dioxide Level 34H, Calcium Level 9.0, Total Creatine 

Kinase 121, Creatine Kinase MB 1.9, Creatine Kinase MB Relative Index 1.57, 

Troponin I < 0.02


12/6/17 11:48: 


Prothrombin Time 13.1, Prothromb Time International Ratio 0.98, Activated 

Partial Thromboplast Time 31.9, NT-Pro-B-Type Natriuretic Peptide 5468H, 

Thyroid Stimulating Hormone (TSH) 16.500H, Valproic Acid (Depakene) Level 27.6L


12/6/17 11:54: 


Blood Gas Bicarbonate Standard 28.6H, Arterial Blood pH 7.528H, Arterial Blood 

Partial Pressure CO2 33.1L, Arterial Blood Partial Pressure O2 77.0, Arterial 

Blood Total CO2 27.9, Arterial Blood HCO3 26.9H, Arterial Blood Base Excess 4.6H

, Arterial Blood Oxygen Saturation 96.0


12/6/17 16:34: 


12/6/17 16:36: Lactic Acid Followup at 4 Hours 1.9


12/7/17 07:05: 


Nucleated Red Blood Cells % (auto) 0.0, Anion Gap 10, Glomerular Filtration 

Rate > 60.0, Blood Urea Nitrogen 18, Creatinine 0.90, Sodium Level 143, 

Potassium Level 3.5, Chloride Level 103, Carbon Dioxide Level 30, Calcium Level 

8.2L


CBC/BMP


Laboratory Tests


12/6/17 11:44








Red Blood Count 4.02, Mean Corpuscular Volume 104.2 H, Mean Corpuscular 

Hemoglobin 36.1 H, Mean Corpuscular Hemoglobin Concent 34.6, Red Cell 

Distribution Width 15.2 H, Neutrophils (%) (Auto) 65.3, Lymphocytes (%) (Auto) 

24.3, Monocytes (%) (Auto) 9.4 H, Eosinophils (%) (Auto) 0.3, Basophils (%) (

Auto) 0.3, Neutrophils # (Auto) 5.1, Lymphocytes # (Auto) 1.9, Monocytes # (Auto

) 0.7, Eosinophils # (Auto) 0.0, Basophils # (Auto) 0.0, Calcium Level 9.0, 

Total Creatine Kinase 121





12/7/17 07:05








Red Blood Count 3.71 L, Mean Corpuscular Volume 104.6 H, Mean Corpuscular 

Hemoglobin 35.3 H, Mean Corpuscular Hemoglobin Concent 33.8, Red Cell 

Distribution Width 15.1 H, Calcium Level 8.2 L


Microbiology





Microbiology


12/6/17 Blood Culture, Received


          Pending


12/6/17 Blood Culture, Received


          Pending











YANY CLARK DO Dec 7, 2017 10:13

## 2017-12-08 VITALS — SYSTOLIC BLOOD PRESSURE: 114 MMHG | DIASTOLIC BLOOD PRESSURE: 60 MMHG

## 2017-12-08 VITALS — DIASTOLIC BLOOD PRESSURE: 81 MMHG | SYSTOLIC BLOOD PRESSURE: 148 MMHG

## 2017-12-08 VITALS — DIASTOLIC BLOOD PRESSURE: 61 MMHG | SYSTOLIC BLOOD PRESSURE: 146 MMHG

## 2017-12-08 VITALS — SYSTOLIC BLOOD PRESSURE: 135 MMHG | DIASTOLIC BLOOD PRESSURE: 75 MMHG

## 2017-12-08 VITALS — SYSTOLIC BLOOD PRESSURE: 117 MMHG | DIASTOLIC BLOOD PRESSURE: 54 MMHG

## 2017-12-08 LAB
ANION GAP SERPL CALC-SCNC: 6 MEQ/L (ref 8–16)
BUN SERPL-MCNC: 18 MG/DL (ref 7–18)
CALCIUM SERPL-MCNC: 8.2 MG/DL (ref 8.8–10.2)
CHLORIDE SERPL-SCNC: 102 MEQ/L (ref 98–107)
CO2 SERPL-SCNC: 35 MEQ/L (ref 21–32)
CREAT SERPL-MCNC: 0.84 MG/DL (ref 0.55–1.02)
ERYTHROCYTE [DISTWIDTH] IN BLOOD BY AUTOMATED COUNT: 15 % (ref 11.5–14.5)
GFR SERPL CREATININE-BSD FRML MDRD: > 60 ML/MIN/{1.73_M2} (ref 32–?)
GLUCOSE SERPL-MCNC: 115 MG/DL (ref 83–110)
MCH RBC QN AUTO: 35.2 PG (ref 27–33)
MCHC RBC AUTO-ENTMCNC: 33.8 G/DL (ref 32–36.5)
MCV RBC AUTO: 104.2 FL (ref 80–96)
NRBC BLD AUTO-RTO: 0.4 % (ref 0–0)
PLATELET # BLD AUTO: 222 10^3/UL (ref 150–450)
POTASSIUM SERPL-SCNC: 3.4 MEQ/L (ref 3.5–5.1)
SODIUM SERPL-SCNC: 143 MEQ/L (ref 136–145)
WBC # BLD AUTO: 9.6 10^3/UL (ref 4–10)

## 2017-12-08 RX ADMIN — POTASSIUM CHLORIDE SCH MEQ: 750 TABLET, EXTENDED RELEASE ORAL at 09:20

## 2017-12-08 RX ADMIN — APIXABAN SCH MG: 2.5 TABLET, FILM COATED ORAL at 09:19

## 2017-12-08 RX ADMIN — CARBIDOPA AND LEVODOPA SCH TAB: 25; 100 TABLET ORAL at 13:02

## 2017-12-08 RX ADMIN — METOPROLOL SUCCINATE SCH MG: 25 TABLET, EXTENDED RELEASE ORAL at 21:54

## 2017-12-08 RX ADMIN — POTASSIUM CHLORIDE SCH MEQ: 750 TABLET, EXTENDED RELEASE ORAL at 21:54

## 2017-12-08 RX ADMIN — FUROSEMIDE SCH MG: 10 INJECTION, SOLUTION INTRAMUSCULAR; INTRAVENOUS at 06:29

## 2017-12-08 RX ADMIN — ATORVASTATIN CALCIUM SCH MG: 10 TABLET, FILM COATED ORAL at 21:52

## 2017-12-08 RX ADMIN — MULTIPLE VITAMINS W/ MINERALS TAB SCH TAB: TAB at 09:20

## 2017-12-08 RX ADMIN — FERROUS SULFATE TAB 325 MG (65 MG ELEMENTAL FE) SCH MG: 325 (65 FE) TAB at 09:20

## 2017-12-08 RX ADMIN — OMEPRAZOLE SCH MG: 20 CAPSULE, DELAYED RELEASE ORAL at 21:52

## 2017-12-08 RX ADMIN — LEVOTHYROXINE SODIUM SCH MCG: 150 TABLET ORAL at 06:29

## 2017-12-08 RX ADMIN — OMEPRAZOLE SCH MG: 20 CAPSULE, DELAYED RELEASE ORAL at 09:20

## 2017-12-08 RX ADMIN — DIVALPROEX SODIUM SCH MG: 500 TABLET, FILM COATED, EXTENDED RELEASE ORAL at 09:19

## 2017-12-08 RX ADMIN — CARBIDOPA AND LEVODOPA SCH TAB: 25; 100 TABLET ORAL at 21:52

## 2017-12-08 RX ADMIN — OXYBUTYNIN CHLORIDE SCH MG: 5 TABLET, EXTENDED RELEASE ORAL at 09:20

## 2017-12-08 RX ADMIN — METOPROLOL SUCCINATE SCH MG: 25 TABLET, EXTENDED RELEASE ORAL at 09:19

## 2017-12-08 RX ADMIN — CARBIDOPA AND LEVODOPA SCH TAB: 25; 100 TABLET ORAL at 17:25

## 2017-12-08 RX ADMIN — PRIMIDONE SCH MG: 50 TABLET ORAL at 21:53

## 2017-12-08 RX ADMIN — DIVALPROEX SODIUM SCH MG: 500 TABLET, FILM COATED, EXTENDED RELEASE ORAL at 21:53

## 2017-12-08 RX ADMIN — FUROSEMIDE SCH MG: 10 INJECTION, SOLUTION INTRAMUSCULAR; INTRAVENOUS at 00:18

## 2017-12-08 RX ADMIN — FOLIC ACID SCH MG: 1 TABLET ORAL at 21:52

## 2017-12-08 RX ADMIN — PRIMIDONE SCH MG: 50 TABLET ORAL at 09:19

## 2017-12-08 RX ADMIN — CARBIDOPA AND LEVODOPA SCH TAB: 25; 100 TABLET ORAL at 09:20

## 2017-12-08 RX ADMIN — DOCUSATE SODIUM SCH MG: 100 CAPSULE, LIQUID FILLED ORAL at 21:52

## 2017-12-08 RX ADMIN — LORATADINE SCH MG: 10 TABLET ORAL at 09:19

## 2017-12-08 RX ADMIN — DOCUSATE SODIUM SCH MG: 100 CAPSULE, LIQUID FILLED ORAL at 09:19

## 2017-12-08 RX ADMIN — ASPIRIN SCH MG: 325 TABLET, COATED ORAL at 09:20

## 2017-12-08 RX ADMIN — APIXABAN SCH MG: 2.5 TABLET, FILM COATED ORAL at 21:52

## 2017-12-08 NOTE — IPNPDOC
Text Note


Date of Service


The patient was seen on 12/8/17.





NOTE


Subjective:


Patient is an 87 year old  female with a PMHx of A. fib, Diastolic CHF

, MV & AV Regurgitation, Ventricular ectopy, Parkinson's, PE (previously on 

Coumadin, with bleeding complication), DLP, Hypothyroidism, Polymyalgia, RA, 

who presented to the ER with SOB and non-productive cough for 10 days. She was 

found down at home and brought into the ER. 





She was found to have a non-occlusive DVT and a negative workup for PE. She has 

been started on Eliquis. She appeared to have signs of fluid overload and 

started on IV diuretics. 





Patient was seen and examined at the bedside. She denies any chest pain or 

palpitations, reports improvement in her breathing. 





Objective:


Vitals (See below)


General: Lying in bed, no acute distress, comfortable, AAOx3


HEENT: NC, AT


CVS: RRR, +S1S2


Lungs: Fair air entry b/l, -w/r/r


Abdomen: Soft, ND, NT


Extremities: Diffuse edema - non-pitting, - Calf tenderness





Assessment and plan:


Dyspnea - likely 2/2 fluid overload - likely 2/2 decompensated diastolic CHF


- Presented to the ER with signs of fluid overload


- Physical currently reveals non-pitting edema, no appreciable crackles; 

difficult to assess volume status


- proBNP elevated from admission


- CTA Chest 12/6: no PE, dependent atelectasis noted


- c/w Diuresis with Lasix 40 IV q6h; with holding parameters


- c/w Strict I/Os, Daily weights and head of bed elevation


- Titrate off supplemental oxygen 





Non-occlusive Left LE DVT


- Risks and benefits of anticoagulation were discussed with family


- c/w Eliquis for minimum of 3 months


- Dr. Parra (PCP) will continue to manage as an outpatient 





Hypokalemia


- s/p supplementation 





Weakness - likely 2/2 deconditioning


- c/w Physical therapy 





s/p Lactic acidosis


- No evidence of infection


- Hold off on antibiotics





A. fib with episode of RVR


- currently rate controlled


- off telemetry


- c/w metoprolol succinate for rate control


- on full anticoagulation with Eliquis


- Will reduce dose of ASA to 81





Parkinson's


- c/w Carbidopa / Levodopa 


- c/w Primidone 





Hx of PE


- previously on Coumadin, with bleeding complication





DLP


- c/w Atorvastatin 





Hypothyroidism


- c/w Levothyroxine 





Polymyalgia / RA


- c/w Prednisone 





Depression


- c/w Paroxetine 





GERD


- c/w Omeprazole





DVT prophylaxis


- on full anticoagulation with Eliquis





VS,Fishbone, I+O


VS, Fishbone, I+O


Laboratory Tests


12/8/17 05:43








Red Blood Count 3.78 L, Mean Corpuscular Volume 104.2 H, Mean Corpuscular 

Hemoglobin 35.2 H, Mean Corpuscular Hemoglobin Concent 33.8, Red Cell 

Distribution Width 15.0 H, Calcium Level 8.2 L








Vital Signs








  Date Time  Temp Pulse Resp B/P (MAP) Pulse Ox O2 Delivery O2 Flow Rate FiO2


 


12/8/17 10:00 98.0 103 20 135/75 (95) 94 Nasal Cannula 1.0 














I&O- Last 24 Hours up to 6 AM 


 


 12/9/17





 06:00


 


Intake Total 120 ml


 


Output Total 400 ml


 


Balance -280 ml

















SHAHRZAD POTTER MD Dec 8, 2017 13:44

## 2017-12-09 VITALS — DIASTOLIC BLOOD PRESSURE: 60 MMHG | SYSTOLIC BLOOD PRESSURE: 114 MMHG

## 2017-12-09 VITALS — DIASTOLIC BLOOD PRESSURE: 71 MMHG | SYSTOLIC BLOOD PRESSURE: 120 MMHG

## 2017-12-09 VITALS — DIASTOLIC BLOOD PRESSURE: 78 MMHG | SYSTOLIC BLOOD PRESSURE: 137 MMHG

## 2017-12-09 VITALS — SYSTOLIC BLOOD PRESSURE: 133 MMHG | DIASTOLIC BLOOD PRESSURE: 71 MMHG

## 2017-12-09 VITALS — DIASTOLIC BLOOD PRESSURE: 60 MMHG | SYSTOLIC BLOOD PRESSURE: 106 MMHG

## 2017-12-09 VITALS — DIASTOLIC BLOOD PRESSURE: 66 MMHG | SYSTOLIC BLOOD PRESSURE: 126 MMHG

## 2017-12-09 LAB
ANION GAP SERPL CALC-SCNC: 7 MEQ/L (ref 8–16)
BUN SERPL-MCNC: 22 MG/DL (ref 7–18)
CALCIUM SERPL-MCNC: 8.9 MG/DL (ref 8.8–10.2)
CHLORIDE SERPL-SCNC: 101 MEQ/L (ref 98–107)
CO2 SERPL-SCNC: 32 MEQ/L (ref 21–32)
CREAT SERPL-MCNC: 1.08 MG/DL (ref 0.55–1.02)
ERYTHROCYTE [DISTWIDTH] IN BLOOD BY AUTOMATED COUNT: 15.3 % (ref 11.5–14.5)
GFR SERPL CREATININE-BSD FRML MDRD: 51.1 ML/MIN/{1.73_M2} (ref 32–?)
GLUCOSE SERPL-MCNC: 106 MG/DL (ref 83–110)
MCH RBC QN AUTO: 35.4 PG (ref 27–33)
MCHC RBC AUTO-ENTMCNC: 33.6 G/DL (ref 32–36.5)
MCV RBC AUTO: 105.5 FL (ref 80–96)
NRBC BLD AUTO-RTO: 0.1 % (ref 0–0)
PLATELET # BLD AUTO: 231 10^3/UL (ref 150–450)
POTASSIUM SERPL-SCNC: 3.8 MEQ/L (ref 3.5–5.1)
SODIUM SERPL-SCNC: 140 MEQ/L (ref 136–145)
WBC # BLD AUTO: 13.7 10^3/UL (ref 4–10)

## 2017-12-09 RX ADMIN — METOPROLOL SUCCINATE SCH MG: 25 TABLET, EXTENDED RELEASE ORAL at 09:57

## 2017-12-09 RX ADMIN — FOLIC ACID SCH MG: 1 TABLET ORAL at 21:04

## 2017-12-09 RX ADMIN — DIVALPROEX SODIUM SCH MG: 500 TABLET, FILM COATED, EXTENDED RELEASE ORAL at 21:04

## 2017-12-09 RX ADMIN — APIXABAN SCH MG: 2.5 TABLET, FILM COATED ORAL at 21:07

## 2017-12-09 RX ADMIN — DOCUSATE SODIUM SCH MG: 100 CAPSULE, LIQUID FILLED ORAL at 21:04

## 2017-12-09 RX ADMIN — OMEPRAZOLE SCH MG: 20 CAPSULE, DELAYED RELEASE ORAL at 09:50

## 2017-12-09 RX ADMIN — DOCUSATE SODIUM SCH MG: 100 CAPSULE, LIQUID FILLED ORAL at 09:50

## 2017-12-09 RX ADMIN — PRIMIDONE SCH MG: 50 TABLET ORAL at 09:58

## 2017-12-09 RX ADMIN — ATORVASTATIN CALCIUM SCH MG: 10 TABLET, FILM COATED ORAL at 21:04

## 2017-12-09 RX ADMIN — FERROUS SULFATE TAB 325 MG (65 MG ELEMENTAL FE) SCH MG: 325 (65 FE) TAB at 09:50

## 2017-12-09 RX ADMIN — CARBIDOPA AND LEVODOPA SCH TAB: 25; 100 TABLET ORAL at 09:50

## 2017-12-09 RX ADMIN — CARBIDOPA AND LEVODOPA SCH TAB: 25; 100 TABLET ORAL at 21:06

## 2017-12-09 RX ADMIN — OMEPRAZOLE SCH MG: 20 CAPSULE, DELAYED RELEASE ORAL at 21:07

## 2017-12-09 RX ADMIN — LEVOTHYROXINE SODIUM SCH MCG: 150 TABLET ORAL at 05:35

## 2017-12-09 RX ADMIN — POTASSIUM CHLORIDE SCH MEQ: 750 TABLET, EXTENDED RELEASE ORAL at 09:49

## 2017-12-09 RX ADMIN — POTASSIUM CHLORIDE SCH MEQ: 750 TABLET, EXTENDED RELEASE ORAL at 21:06

## 2017-12-09 RX ADMIN — ASPIRIN SCH MG: 81 TABLET ORAL at 09:49

## 2017-12-09 RX ADMIN — CARBIDOPA AND LEVODOPA SCH TAB: 25; 100 TABLET ORAL at 17:23

## 2017-12-09 RX ADMIN — APIXABAN SCH MG: 2.5 TABLET, FILM COATED ORAL at 09:49

## 2017-12-09 RX ADMIN — OXYBUTYNIN CHLORIDE SCH MG: 5 TABLET, EXTENDED RELEASE ORAL at 09:48

## 2017-12-09 RX ADMIN — MULTIPLE VITAMINS W/ MINERALS TAB SCH TAB: TAB at 09:48

## 2017-12-09 RX ADMIN — PRIMIDONE SCH MG: 50 TABLET ORAL at 21:03

## 2017-12-09 RX ADMIN — METOPROLOL SUCCINATE SCH MG: 25 TABLET, EXTENDED RELEASE ORAL at 21:00

## 2017-12-09 RX ADMIN — LORATADINE SCH MG: 10 TABLET ORAL at 09:49

## 2017-12-09 RX ADMIN — CARBIDOPA AND LEVODOPA SCH TAB: 25; 100 TABLET ORAL at 13:11

## 2017-12-09 RX ADMIN — DIVALPROEX SODIUM SCH MG: 500 TABLET, FILM COATED, EXTENDED RELEASE ORAL at 09:50

## 2017-12-09 NOTE — IPNPDOC
Text Note


Date of Service


The patient was seen on 12/9/17.





NOTE


Subjective:


Patient is an 87 year old  female with a PMHx of A. fib, Diastolic CHF

, MV & AV Regurgitation, Ventricular ectopy, Parkinson's, PE (previously on 

Coumadin, with bleeding complication), DLP, Hypothyroidism, Polymyalgia, RA, 

who presented to the ER with SOB and non-productive cough for 10 days. She was 

found down at home and brought into the ER. 





She was found to have a non-occlusive DVT and a negative workup for PE. She has 

been started on Eliquis. She appeared to have signs of fluid overload and 

started on IV diuretics. 





Patient was seen and examined at the bedside. She notes that her breathing does 

feel a little off today. She denies any chest pain or palpitations. She denies 

any abdominal pain, constipation, diarrhea or dysuria. 





Objective:


Vitals (See below)


General: Lying in bed, no acute distress, comfortable, AAOx3


HEENT: NC, AT


CVS: RRR, +S1S2


Lungs: Fair air entry b/l, -w/r/r


Abdomen: Soft, ND, NT


Extremities: Diffuse edema - non-pitting, - Calf tenderness





Assessment and plan:


Dyspnea - possibly 2/2 fluid overload - likely 2/2 decompensated diastolic CHF


- Presented to the ER with signs of fluid overload


- Physical currently reveals non-pitting edema, no appreciable crackles; 

difficult to assess volume status


- proBNP elevated from admission


- Mild elevation in Cr this morning; likely euvolemia achieved 


- CTA Chest 12/6: no PE, dependent atelectasis noted


- s/p Lasix 


- c/w Strict I/Os, Daily weights and head of bed elevation


- Will continue to try to wean off supplemental oxygen 





Non-occlusive Left LE DVT


- Risks and benefits of anticoagulation were discussed with family


- c/w Eliquis for minimum of 3 months


- Dr. Parra (PCP) will continue to manage as an outpatient 





Leukocytosis


- Review of systems does not reveal any signs of infection


- Remains afebrile 


- No lactic acidosis 


- UA with 1+ LE, No nitrates, 39 WBC, No bacteria


- CXR 12/9: pending 


- Will hold off on antibiotics 





Hematuria


- Was reported to have hematuria this morning


- Urine still appears yellow; without gross blood


- Urinalysis with 2+ blood


- will c/w Eliquis 





s/p Hypokalemia





Weakness - likely 2/2 deconditioning


- c/w Physical therapy 





s/p Lactic acidosis


- No evidence of infection





A. fib with episode of RVR


- currently rate controlled


- off telemetry


- c/w metoprolol succinate for rate control


- on full anticoagulation with Eliquis





Parkinson's


- c/w Carbidopa / Levodopa 


- c/w Primidone 





Hx of PE


- previously on Coumadin, with bleeding complication (rectal bleeding) 





DLP


- c/w Atorvastatin 





Hypothyroidism


- c/w Levothyroxine 





Polymyalgia / RA


- c/w Prednisone 





Depression


- c/w Paroxetine 





GERD


- c/w Omeprazole





DVT prophylaxis


- on full anticoagulation with Eliquis





VS,Fishbone, I+O


VS, Fishbone, I+O


Laboratory Tests


12/9/17 06:15








Calcium Level 8.9





12/9/17 06:21








Red Blood Count 3.81 L, Mean Corpuscular Volume 105.5 H, Mean Corpuscular 

Hemoglobin 35.4 H, Mean Corpuscular Hemoglobin Concent 33.6, Red Cell 

Distribution Width 15.3 H








Vital Signs








  Date Time  Temp Pulse Resp B/P (MAP) Pulse Ox O2 Delivery O2 Flow Rate FiO2


 


12/9/17 09:57  82  104/59    


 


12/9/17 06:00 98.6  18  93 Nasal Cannula 1.0 














I&O- Last 24 Hours up to 6 AM 


 


 12/10/17





 06:00


 


Intake Total 120 ml


 


Balance 120 ml

















SHAHRZAD POTTER MD Dec 9, 2017 11:45

## 2017-12-09 NOTE — REP
REASON:  Cough and dyspnea.

 

COMPARISON:  Multiple latest, 12/06/2017.

 

The inferior lung fields have not been included on the frontal AP exam.  Small

pleural effusions cannot be excluded by this limited exam.  There is cardiomegaly

accentuated by technique.  The technique utilized in obtaining the radiograph has

magnified the cardiac silhouette and accentuated the interstitial markings.

There is no change in the appearance of the lung fields.  No acute patchy

opacities have developed.  There is no change in the osseous structures.

 

IMPRESSION:

 

Limited exam as described above.  Otherwise no change from the prior exam.

 

 

Signed by

Barber Bryant DO 12/09/2017 09:19 A

## 2017-12-10 VITALS — SYSTOLIC BLOOD PRESSURE: 116 MMHG | DIASTOLIC BLOOD PRESSURE: 58 MMHG

## 2017-12-10 VITALS — DIASTOLIC BLOOD PRESSURE: 65 MMHG | SYSTOLIC BLOOD PRESSURE: 113 MMHG

## 2017-12-10 VITALS — SYSTOLIC BLOOD PRESSURE: 104 MMHG | DIASTOLIC BLOOD PRESSURE: 67 MMHG

## 2017-12-10 VITALS — SYSTOLIC BLOOD PRESSURE: 128 MMHG | DIASTOLIC BLOOD PRESSURE: 59 MMHG

## 2017-12-10 VITALS — DIASTOLIC BLOOD PRESSURE: 66 MMHG | SYSTOLIC BLOOD PRESSURE: 119 MMHG

## 2017-12-10 VITALS — DIASTOLIC BLOOD PRESSURE: 58 MMHG | SYSTOLIC BLOOD PRESSURE: 120 MMHG

## 2017-12-10 LAB
ANION GAP SERPL CALC-SCNC: 9 MEQ/L (ref 8–16)
BUN SERPL-MCNC: 20 MG/DL (ref 7–18)
CALCIUM SERPL-MCNC: 8.3 MG/DL (ref 8.8–10.2)
CHLORIDE SERPL-SCNC: 102 MEQ/L (ref 98–107)
CO2 SERPL-SCNC: 28 MEQ/L (ref 21–32)
CREAT SERPL-MCNC: 0.74 MG/DL (ref 0.55–1.02)
ERYTHROCYTE [DISTWIDTH] IN BLOOD BY AUTOMATED COUNT: 14.9 % (ref 11.5–14.5)
GFR SERPL CREATININE-BSD FRML MDRD: > 60 ML/MIN/{1.73_M2} (ref 32–?)
GLUCOSE SERPL-MCNC: 141 MG/DL (ref 83–110)
MCH RBC QN AUTO: 35.3 PG (ref 27–33)
MCHC RBC AUTO-ENTMCNC: 34 G/DL (ref 32–36.5)
MCV RBC AUTO: 103.8 FL (ref 80–96)
NRBC BLD AUTO-RTO: 0.2 % (ref 0–0)
PLATELET # BLD AUTO: 246 10^3/UL (ref 150–450)
POTASSIUM SERPL-SCNC: 4.3 MEQ/L (ref 3.5–5.1)
SODIUM SERPL-SCNC: 139 MEQ/L (ref 136–145)
WBC # BLD AUTO: 13.3 10^3/UL (ref 4–10)

## 2017-12-10 RX ADMIN — TORSEMIDE SCH MG: 20 TABLET ORAL at 08:16

## 2017-12-10 RX ADMIN — TORSEMIDE SCH MG: 20 TABLET ORAL at 17:24

## 2017-12-10 RX ADMIN — CARBIDOPA AND LEVODOPA SCH TAB: 25; 100 TABLET ORAL at 20:59

## 2017-12-10 RX ADMIN — POTASSIUM CHLORIDE SCH MEQ: 750 TABLET, EXTENDED RELEASE ORAL at 08:17

## 2017-12-10 RX ADMIN — ASPIRIN SCH MG: 81 TABLET ORAL at 08:18

## 2017-12-10 RX ADMIN — CARBIDOPA AND LEVODOPA SCH TAB: 25; 100 TABLET ORAL at 08:16

## 2017-12-10 RX ADMIN — ATORVASTATIN CALCIUM SCH MG: 10 TABLET, FILM COATED ORAL at 20:59

## 2017-12-10 RX ADMIN — METOPROLOL SUCCINATE SCH MG: 25 TABLET, EXTENDED RELEASE ORAL at 08:18

## 2017-12-10 RX ADMIN — DIVALPROEX SODIUM SCH MG: 500 TABLET, FILM COATED, EXTENDED RELEASE ORAL at 21:01

## 2017-12-10 RX ADMIN — PRIMIDONE SCH MG: 50 TABLET ORAL at 20:58

## 2017-12-10 RX ADMIN — PRIMIDONE SCH MG: 50 TABLET ORAL at 08:15

## 2017-12-10 RX ADMIN — CARBIDOPA AND LEVODOPA SCH TAB: 25; 100 TABLET ORAL at 14:02

## 2017-12-10 RX ADMIN — OMEPRAZOLE SCH MG: 20 CAPSULE, DELAYED RELEASE ORAL at 21:00

## 2017-12-10 RX ADMIN — DOCUSATE SODIUM SCH MG: 100 CAPSULE, LIQUID FILLED ORAL at 08:18

## 2017-12-10 RX ADMIN — FOLIC ACID SCH MG: 1 TABLET ORAL at 20:59

## 2017-12-10 RX ADMIN — OMEPRAZOLE SCH MG: 20 CAPSULE, DELAYED RELEASE ORAL at 08:17

## 2017-12-10 RX ADMIN — OXYBUTYNIN CHLORIDE SCH MG: 5 TABLET, EXTENDED RELEASE ORAL at 08:15

## 2017-12-10 RX ADMIN — METOPROLOL SUCCINATE SCH MG: 25 TABLET, EXTENDED RELEASE ORAL at 21:00

## 2017-12-10 RX ADMIN — CARBIDOPA AND LEVODOPA SCH TAB: 25; 100 TABLET ORAL at 17:21

## 2017-12-10 RX ADMIN — LEVOTHYROXINE SODIUM SCH MCG: 150 TABLET ORAL at 06:15

## 2017-12-10 RX ADMIN — DIVALPROEX SODIUM SCH MG: 500 TABLET, FILM COATED, EXTENDED RELEASE ORAL at 08:15

## 2017-12-10 RX ADMIN — MULTIPLE VITAMINS W/ MINERALS TAB SCH TAB: TAB at 08:18

## 2017-12-10 RX ADMIN — LORATADINE SCH MG: 10 TABLET ORAL at 08:19

## 2017-12-10 RX ADMIN — DOCUSATE SODIUM SCH MG: 100 CAPSULE, LIQUID FILLED ORAL at 20:59

## 2017-12-10 RX ADMIN — APIXABAN SCH MG: 2.5 TABLET, FILM COATED ORAL at 21:00

## 2017-12-10 RX ADMIN — APIXABAN SCH MG: 2.5 TABLET, FILM COATED ORAL at 08:17

## 2017-12-10 RX ADMIN — FERROUS SULFATE TAB 325 MG (65 MG ELEMENTAL FE) SCH MG: 325 (65 FE) TAB at 08:19

## 2017-12-10 RX ADMIN — POTASSIUM CHLORIDE SCH MEQ: 750 TABLET, EXTENDED RELEASE ORAL at 21:00

## 2017-12-10 NOTE — IPNPDOC
Text Note


Date of Service


The patient was seen on 12/10/17.





NOTE


Subjective:


Patient is an 87 year old  female with a PMHx of A. fib, Diastolic CHF

, MV & AV Regurgitation, Ventricular ectopy, Parkinson's, PE (previously on 

Coumadin, with bleeding complication), DLP, Hypothyroidism, Polymyalgia, RA, 

who presented to the ER with SOB and non-productive cough for 10 days. She was 

found down at home and brought into the ER. 





She was found to have a non-occlusive DVT and a negative workup for PE. She has 

been started on Eliquis. She appeared to have signs of fluid overload and 

started on IV diuretics. 





Patient was seen and examined at the bedside. She notes her breathing is doing 

slightly better. She still has a cough. Denies any wheezing. No blood in her 

urine was reported this morning. She is noted to have increased amount of urine 

on her bladder scan and will have a Hunt catheter placed again today. 





Objective:


Vitals (See below)


General: Lying in bed, no acute distress, comfortable, AAOx3


HEENT: NC, AT


CVS: RRR, +S1S2


Lungs: Fair air entry b/l, -w/r/r


Abdomen: Soft, ND, NT


Extremities: Diffuse edema - non-pitting, - Calf tenderness





Assessment and plan:


Dyspnea - possibly 2/2 fluid overload - likely 2/2 decompensated diastolic CHF, 

possible asthma 


- Presented to the ER with signs of fluid overload


- Currently still on supplemental oxygen 


- proBNP elevated from admission


- Cr is normalized 


- CTA Chest 12/6: no PE, dependent atelectasis noted


- Will start Torsemide 60mg PO BID today; s/p Lasix 


- c/w Prednisone at 40 BID


- c/w Strict I/Os, Daily weights and head of bed elevation


- c/w Xopinex inhaled therapy 


- c/w Supplemental oxygen - will try and continue to wean off





Non-occlusive Left LE DVT


- Risks and benefits of anticoagulation were discussed with family


- c/w Eliquis for minimum of 3 months


- Dr. Parra (PCP) will continue to manage as an outpatient 





Leukocytosis


- Remains stable, No lactic acidosis 


- Review of systems does not reveal any signs of infection


- Remains afebrile 


- UA with 1+ LE, No nitrates, 39 WBC, No bacteria


- CXR 12/9: stable changes 


- Will hold off on antibiotics 





s/p Hematuria


- Reported to have hematuria on 12/9 AM


- Urine still appears yellow; without gross blood


- Urinalysis with 2+ blood


- c/w Eliquis 





s/p Hypokalemia





Weakness - likely 2/2 deconditioning


- c/w Physical therapy 





s/p Lactic acidosis


- No evidence of infection





A. fib with episode of RVR


- currently rate controlled


- off telemetry


- c/w metoprolol succinate for rate control


- on full anticoagulation with Eliquis





Parkinson's


- c/w Carbidopa / Levodopa 


- c/w Primidone 





Hx of PE


- previously on Coumadin, with bleeding complication (rectal bleeding) 





DLP


- c/w Atorvastatin 





Hypothyroidism


- c/w Levothyroxine 





Polymyalgia / RA


- c/w Prednisone (will need to return back to baseline dose when breathing has 

improved)





Depression


- c/w Paroxetine 





GERD


- c/w Omeprazole





DVT prophylaxis


- on full anticoagulation with Eliquis





VS,Fishbone, I+O


VS, Fishbone, I+O


Laboratory Tests


12/10/17 06:05








Red Blood Count 3.43 L, Mean Corpuscular Volume 103.8 H, Mean Corpuscular 

Hemoglobin 35.3 H, Mean Corpuscular Hemoglobin Concent 34.0, Red Cell 

Distribution Width 14.9 H, Calcium Level 8.3 L








Vital Signs








  Date Time  Temp Pulse Resp B/P (MAP) Pulse Ox O2 Delivery O2 Flow Rate FiO2


 


12/10/17 10:00 99.7 112 22 104/67 (79) 91 Room Air  


 


12/10/17 10:00       2.0 














I&O- Last 24 Hours up to 6 AM 


 


 12/11/17





 06:00


 


Output Total 700 ml


 


Balance -700 ml

















SHAHRZAD POTTER MD Dec 10, 2017 11:58

## 2017-12-11 VITALS — SYSTOLIC BLOOD PRESSURE: 128 MMHG | DIASTOLIC BLOOD PRESSURE: 65 MMHG

## 2017-12-11 VITALS — DIASTOLIC BLOOD PRESSURE: 81 MMHG | SYSTOLIC BLOOD PRESSURE: 131 MMHG

## 2017-12-11 VITALS — SYSTOLIC BLOOD PRESSURE: 109 MMHG | DIASTOLIC BLOOD PRESSURE: 67 MMHG

## 2017-12-11 VITALS — SYSTOLIC BLOOD PRESSURE: 127 MMHG | DIASTOLIC BLOOD PRESSURE: 60 MMHG

## 2017-12-11 VITALS — DIASTOLIC BLOOD PRESSURE: 84 MMHG | SYSTOLIC BLOOD PRESSURE: 139 MMHG

## 2017-12-11 VITALS — SYSTOLIC BLOOD PRESSURE: 123 MMHG | DIASTOLIC BLOOD PRESSURE: 66 MMHG

## 2017-12-11 LAB
ANION GAP SERPL CALC-SCNC: 7 MEQ/L (ref 8–16)
BUN SERPL-MCNC: 21 MG/DL (ref 7–18)
CALCIUM SERPL-MCNC: 8.6 MG/DL (ref 8.8–10.2)
CHLORIDE SERPL-SCNC: 99 MEQ/L (ref 98–107)
CO2 SERPL-SCNC: 34 MEQ/L (ref 21–32)
CREAT SERPL-MCNC: 0.83 MG/DL (ref 0.55–1.02)
ERYTHROCYTE [DISTWIDTH] IN BLOOD BY AUTOMATED COUNT: 15.3 % (ref 11.5–14.5)
GFR SERPL CREATININE-BSD FRML MDRD: > 60 ML/MIN/{1.73_M2} (ref 32–?)
GLUCOSE SERPL-MCNC: 115 MG/DL (ref 83–110)
MCH RBC QN AUTO: 35.7 PG (ref 27–33)
MCHC RBC AUTO-ENTMCNC: 34.2 G/DL (ref 32–36.5)
MCV RBC AUTO: 104.3 FL (ref 80–96)
NRBC BLD AUTO-RTO: 0.6 % (ref 0–0)
PLATELET # BLD AUTO: 253 10^3/UL (ref 150–450)
POTASSIUM SERPL-SCNC: 4.4 MEQ/L (ref 3.5–5.1)
SODIUM SERPL-SCNC: 140 MEQ/L (ref 136–145)
WBC # BLD AUTO: 9.1 10^3/UL (ref 4–10)

## 2017-12-11 RX ADMIN — DOCUSATE SODIUM SCH MG: 100 CAPSULE, LIQUID FILLED ORAL at 20:31

## 2017-12-11 RX ADMIN — METOPROLOL SUCCINATE SCH MG: 25 TABLET, EXTENDED RELEASE ORAL at 20:24

## 2017-12-11 RX ADMIN — DIVALPROEX SODIUM SCH MG: 500 TABLET, FILM COATED, EXTENDED RELEASE ORAL at 09:49

## 2017-12-11 RX ADMIN — DIVALPROEX SODIUM SCH MG: 500 TABLET, FILM COATED, EXTENDED RELEASE ORAL at 20:31

## 2017-12-11 RX ADMIN — CARBIDOPA AND LEVODOPA SCH TAB: 25; 100 TABLET ORAL at 16:45

## 2017-12-11 RX ADMIN — ATORVASTATIN CALCIUM SCH MG: 10 TABLET, FILM COATED ORAL at 20:31

## 2017-12-11 RX ADMIN — OMEPRAZOLE SCH MG: 20 CAPSULE, DELAYED RELEASE ORAL at 20:30

## 2017-12-11 RX ADMIN — DOCUSATE SODIUM SCH MG: 100 CAPSULE, LIQUID FILLED ORAL at 09:49

## 2017-12-11 RX ADMIN — FERROUS SULFATE TAB 325 MG (65 MG ELEMENTAL FE) SCH MG: 325 (65 FE) TAB at 09:50

## 2017-12-11 RX ADMIN — POTASSIUM CHLORIDE SCH MEQ: 750 TABLET, EXTENDED RELEASE ORAL at 20:31

## 2017-12-11 RX ADMIN — TORSEMIDE SCH MG: 20 TABLET ORAL at 16:44

## 2017-12-11 RX ADMIN — METOPROLOL SUCCINATE SCH MG: 25 TABLET, EXTENDED RELEASE ORAL at 09:49

## 2017-12-11 RX ADMIN — APIXABAN SCH MG: 2.5 TABLET, FILM COATED ORAL at 09:48

## 2017-12-11 RX ADMIN — CARBIDOPA AND LEVODOPA SCH TAB: 25; 100 TABLET ORAL at 20:31

## 2017-12-11 RX ADMIN — MULTIPLE VITAMINS W/ MINERALS TAB SCH TAB: TAB at 09:50

## 2017-12-11 RX ADMIN — POTASSIUM CHLORIDE SCH MEQ: 750 TABLET, EXTENDED RELEASE ORAL at 09:47

## 2017-12-11 RX ADMIN — ASPIRIN SCH MG: 81 TABLET ORAL at 09:47

## 2017-12-11 RX ADMIN — PRIMIDONE SCH MG: 50 TABLET ORAL at 20:30

## 2017-12-11 RX ADMIN — OXYBUTYNIN CHLORIDE SCH MG: 5 TABLET, EXTENDED RELEASE ORAL at 09:47

## 2017-12-11 RX ADMIN — LEVOTHYROXINE SODIUM SCH MCG: 150 TABLET ORAL at 06:24

## 2017-12-11 RX ADMIN — OMEPRAZOLE SCH MG: 20 CAPSULE, DELAYED RELEASE ORAL at 09:49

## 2017-12-11 RX ADMIN — CARBIDOPA AND LEVODOPA SCH TAB: 25; 100 TABLET ORAL at 16:41

## 2017-12-11 RX ADMIN — APIXABAN SCH MG: 2.5 TABLET, FILM COATED ORAL at 20:31

## 2017-12-11 RX ADMIN — LORATADINE SCH MG: 10 TABLET ORAL at 09:47

## 2017-12-11 RX ADMIN — FOLIC ACID SCH MG: 1 TABLET ORAL at 20:31

## 2017-12-11 RX ADMIN — TORSEMIDE SCH MG: 20 TABLET ORAL at 09:46

## 2017-12-11 RX ADMIN — PRIMIDONE SCH MG: 50 TABLET ORAL at 09:45

## 2017-12-11 RX ADMIN — CARBIDOPA AND LEVODOPA SCH TAB: 25; 100 TABLET ORAL at 09:46

## 2017-12-11 NOTE — IPNPDOC
Text Note


Date of Service


The patient was seen on 12/11/17.





NOTE


Subjective:


Patient is an 87 year old  female with a PMHx of A. fib, Diastolic CHF

, MV & AV Regurgitation, Ventricular ectopy, Parkinson's, PE (previously on 

Coumadin, with bleeding complication), DLP, Hypothyroidism, Polymyalgia, RA, 

who presented to the ER with SOB and non-productive cough for 10 days. She was 

found down at home and brought into the ER. 





She was found to have a non-occlusive DVT and a negative workup for PE. She has 

been started on Eliquis. She appeared to have signs of fluid overload and 

started on IV diuretics. 





Patient was seen and examined at the bedside. She notes her breathing is still 

the same. Does report a mild cough. No wheezing. Denies chest pain or 

palpitations.





Objective:


Vitals (See below)


General: Lying in bed, no acute distress, comfortable, AAOx3


HEENT: NC, AT


CVS: RRR, +S1S2


Lungs: Fair air entry b/l, -w/r/r


Abdomen: Soft, ND, NT


Extremities: Diffuse edema - non-pitting, - Calf tenderness





Assessment and plan:


Dyspnea - possibly 2/2 fluid overload - likely 2/2 decompensated diastolic CHF, 

possible asthma 


- Notes that her breathing is still about the same; she is still on 

supplemental oxygen 


- proBNP elevated from admission


- CTA Chest 12/6: no PE, dependent atelectasis noted


- c/w Higher dose of Torsemide (60mg PO BID); s/p Lasix (re: confusion reaction?

)


- c/w Prednisone at 40 BID


- c/w Strict I/Os, Daily weights and head of bed elevation; will add fluid 

restriction of 1800 cc only 


- c/w Xopinex inhaled therapy 


- c/w Supplemental oxygen - will try and continue to wean off as oxygenation 

improves 


- Will add incentive spirometry and acapella 





Non-occlusive Left LE DVT


- Risks and benefits of anticoagulation were discussed with family


- c/w Eliquis for minimum of 3 months


- Dr. Parra (PCP) will continue to manage as an outpatient 





s/p Leukocytosis


- Afebrile; ROS does not reveal any signs of infection


- No lactic acidosis 


- UA with 1+ LE, No nitrates, 39 WBC, No bacteria


- CXR 12/9: stable changes 


- No antibiotics at this point 





s/p Hematuria


- Reported to have hematuria on 12/9 AM


- Urine still appears yellow; without gross blood


- Urinalysis with 2+ blood


- c/w Eliquis 





s/p Hypokalemia





Weakness - likely 2/2 deconditioning


- c/w Physical therapy 





s/p Lactic acidosis


- No evidence of infection





A. fib with episode of RVR


- currently rate controlled


- off telemetry


- c/w metoprolol succinate for rate control


- on full anticoagulation with Eliquis





Parkinson's


- c/w Carbidopa / Levodopa 


- c/w Primidone 





Hx of PE


- previously on Coumadin, with bleeding complication (rectal bleeding) 





DLP


- c/w Atorvastatin 





Hypothyroidism


- c/w Levothyroxine 





Polymyalgia / RA


- c/w Prednisone (will need to return back to baseline dose when breathing has 

improved)





Depression


- c/w Paroxetine 





GERD


- c/w Omeprazole





DVT prophylaxis


- on full anticoagulation with Eliquis





Disposition:


- c/w Physical therapy; will determine disposition based on their 

recommendations





Мария GARCIA, I+O


VSМария, I+O


Laboratory Tests


12/11/17 05:39








Red Blood Count 3.45 L, Mean Corpuscular Volume 104.3 H, Mean Corpuscular 

Hemoglobin 35.7 H, Mean Corpuscular Hemoglobin Concent 34.2, Red Cell 

Distribution Width 15.3 H, Calcium Level 8.6 L








Vital Signs








  Date Time  Temp Pulse Resp B/P (MAP) Pulse Ox O2 Delivery O2 Flow Rate FiO2


 


12/11/17 10:18 98.2 88 24 131/81 (98) 96 Nasal Cannula 3.0 














I&O- Last 24 Hours up to 6 AM 


 


 12/12/17





 06:00


 


Intake Total 120 ml


 


Balance 120 ml

















SHAHRZAD POTTER MD Dec 11, 2017 12:08

## 2017-12-12 VITALS — SYSTOLIC BLOOD PRESSURE: 144 MMHG | DIASTOLIC BLOOD PRESSURE: 84 MMHG

## 2017-12-12 VITALS — DIASTOLIC BLOOD PRESSURE: 94 MMHG | SYSTOLIC BLOOD PRESSURE: 143 MMHG

## 2017-12-12 VITALS — SYSTOLIC BLOOD PRESSURE: 105 MMHG | DIASTOLIC BLOOD PRESSURE: 58 MMHG

## 2017-12-12 LAB
ANION GAP SERPL CALC-SCNC: 8 MEQ/L (ref 8–16)
BUN SERPL-MCNC: 20 MG/DL (ref 7–18)
CALCIUM SERPL-MCNC: 8.5 MG/DL (ref 8.8–10.2)
CHLORIDE SERPL-SCNC: 100 MEQ/L (ref 98–107)
CO2 SERPL-SCNC: 31 MEQ/L (ref 21–32)
CREAT SERPL-MCNC: 0.89 MG/DL (ref 0.55–1.02)
ERYTHROCYTE [DISTWIDTH] IN BLOOD BY AUTOMATED COUNT: 15.4 % (ref 11.5–14.5)
GFR SERPL CREATININE-BSD FRML MDRD: > 60 ML/MIN/{1.73_M2} (ref 32–?)
GLUCOSE SERPL-MCNC: 116 MG/DL (ref 83–110)
MCH RBC QN AUTO: 35 PG (ref 27–33)
MCHC RBC AUTO-ENTMCNC: 32.9 G/DL (ref 32–36.5)
MCV RBC AUTO: 106.5 FL (ref 80–96)
NRBC BLD AUTO-RTO: 0.5 % (ref 0–0)
PLATELET # BLD AUTO: 281 10^3/UL (ref 150–450)
POTASSIUM SERPL-SCNC: 4.2 MEQ/L (ref 3.5–5.1)
SODIUM SERPL-SCNC: 139 MEQ/L (ref 136–145)
WBC # BLD AUTO: 10.4 10^3/UL (ref 4–10)

## 2017-12-12 RX ADMIN — PRIMIDONE SCH MG: 50 TABLET ORAL at 09:37

## 2017-12-12 RX ADMIN — CARBIDOPA AND LEVODOPA SCH TAB: 25; 100 TABLET ORAL at 17:10

## 2017-12-12 RX ADMIN — POTASSIUM CHLORIDE SCH MEQ: 750 TABLET, EXTENDED RELEASE ORAL at 09:38

## 2017-12-12 RX ADMIN — APIXABAN SCH MG: 2.5 TABLET, FILM COATED ORAL at 20:10

## 2017-12-12 RX ADMIN — DIVALPROEX SODIUM SCH MG: 500 TABLET, FILM COATED, EXTENDED RELEASE ORAL at 09:36

## 2017-12-12 RX ADMIN — PRIMIDONE SCH MG: 50 TABLET ORAL at 20:08

## 2017-12-12 RX ADMIN — POTASSIUM CHLORIDE SCH MEQ: 750 TABLET, EXTENDED RELEASE ORAL at 20:09

## 2017-12-12 RX ADMIN — ATORVASTATIN CALCIUM SCH MG: 10 TABLET, FILM COATED ORAL at 20:09

## 2017-12-12 RX ADMIN — DOCUSATE SODIUM SCH MG: 100 CAPSULE, LIQUID FILLED ORAL at 09:39

## 2017-12-12 RX ADMIN — CARBIDOPA AND LEVODOPA SCH TAB: 25; 100 TABLET ORAL at 20:10

## 2017-12-12 RX ADMIN — OXYBUTYNIN CHLORIDE SCH MG: 5 TABLET, EXTENDED RELEASE ORAL at 09:40

## 2017-12-12 RX ADMIN — METOPROLOL SUCCINATE SCH MG: 25 TABLET, EXTENDED RELEASE ORAL at 20:00

## 2017-12-12 RX ADMIN — METOPROLOL SUCCINATE SCH MG: 25 TABLET, EXTENDED RELEASE ORAL at 09:39

## 2017-12-12 RX ADMIN — CARBIDOPA AND LEVODOPA SCH TAB: 25; 100 TABLET ORAL at 09:39

## 2017-12-12 RX ADMIN — OMEPRAZOLE SCH MG: 20 CAPSULE, DELAYED RELEASE ORAL at 09:40

## 2017-12-12 RX ADMIN — LORATADINE SCH MG: 10 TABLET ORAL at 09:39

## 2017-12-12 RX ADMIN — APIXABAN SCH MG: 2.5 TABLET, FILM COATED ORAL at 09:39

## 2017-12-12 RX ADMIN — TORSEMIDE SCH MG: 20 TABLET ORAL at 17:09

## 2017-12-12 RX ADMIN — LEVOTHYROXINE SODIUM SCH MCG: 150 TABLET ORAL at 05:41

## 2017-12-12 RX ADMIN — OMEPRAZOLE SCH MG: 20 CAPSULE, DELAYED RELEASE ORAL at 20:08

## 2017-12-12 RX ADMIN — ASPIRIN SCH MG: 81 TABLET ORAL at 09:40

## 2017-12-12 RX ADMIN — FOLIC ACID SCH MG: 1 TABLET ORAL at 20:09

## 2017-12-12 RX ADMIN — DIVALPROEX SODIUM SCH MG: 500 TABLET, FILM COATED, EXTENDED RELEASE ORAL at 20:10

## 2017-12-12 RX ADMIN — MULTIPLE VITAMINS W/ MINERALS TAB SCH TAB: TAB at 09:40

## 2017-12-12 RX ADMIN — CARBIDOPA AND LEVODOPA SCH TAB: 25; 100 TABLET ORAL at 12:28

## 2017-12-12 RX ADMIN — FERROUS SULFATE TAB 325 MG (65 MG ELEMENTAL FE) SCH MG: 325 (65 FE) TAB at 09:40

## 2017-12-12 RX ADMIN — TORSEMIDE SCH MG: 20 TABLET ORAL at 09:38

## 2017-12-12 RX ADMIN — DOCUSATE SODIUM SCH MG: 100 CAPSULE, LIQUID FILLED ORAL at 20:10

## 2017-12-12 NOTE — IPNPDOC
Text Note


Date of Service


The patient was seen on 12/12/17.





NOTE


Subjective: Patient states her dyspnea is mildly improved. Did not participate 

with physical therapy yesterday. Denies chest pain/palpitations.





Objective: 


Vitals: (see below) 


General: No acute distress, laying comfortably in bed.


HEENT: Moist mucous membranes.


Neck: No JVD or lymphadenopathy


Cardiac: RRR, No murmurs


Pulm: Coarse crackles at the bases b/l. No wheezing. Rhonchi bilaterally. No 

stridor. No use of accessory muscles. 


Abd: NT/ND + BS


Ext: 1+ edema right lower extremity greater than the left. No cyanosis. Distal 

pulses intact.





Labs (see below) 





Images:





Assessment/Plan


1. Hypoxemia likely multifactorial secondary to decompensated diastolic heart 

failure, ? Asthma, with questionable component of restrictive lung disease 

given history of rheumatoid arthritis. Patient is being diuresed on torsemide, 

with fluid restriction, and daily weights. Also on prednisone. Continue nebs. 

Incentive spirometer. CTA chest on 12/6 negative for PE.


2. Nonocclusive DVT in left lower extremity- Dr. Dumont had spoken to the family 

regarding the risks and benefits of anticoagulation and they have opted for  

anticoagulation. She is currently on Eliquis. 


3. Leukocytosis likely reactive as well as secondary to prednisone. And no 

signs of infection at this time.


4. Status post hematuria on 12/9. Resolved. Will need outpatient follow-up with 

urology.


5. Generalized weakness secondary to deconditioning- patient encouraged to 

participate with physical therapy


6. Atrial fibrillation- rate is currently controlled. Also on Eliquis. 


7. Parkinson's dz - cont home meds


8. H/o PE s/o coumadin.


9. H/o GI bleed while on coumadin. Hb stable. No bleeding at this time.


10. HLD on statin


11. Hypothyroidism - on synthroid


12. Polymyalgia/RA - cont prednisone. 


13. H/o depression - cont home meds


14. GERD - on PPT





DVT prophy: On Eliquis





VS,Fishbone, I+O


VS, Fishbone, I+O


Laboratory Tests


12/12/17 06:56








Red Blood Count 3.54 L, Mean Corpuscular Volume 106.5 H, Mean Corpuscular 

Hemoglobin 35.0 H, Mean Corpuscular Hemoglobin Concent 32.9, Red Cell 

Distribution Width 15.4 H, Calcium Level 8.5 L








Vital Signs








  Date Time  Temp Pulse Resp B/P (MAP) Pulse Ox O2 Delivery O2 Flow Rate FiO2


 


12/12/17 09:39  96  144/84    


 


12/12/17 06:00 98.0  20  94 Nasal Cannula 3.0 














I&O- Last 24 Hours up to 6 AM 


 


 12/13/17





 06:00


 


Intake Total 360 ml


 


Balance 360 ml

















ARPAN BARBOSA MD Dec 12, 2017 14:51

## 2017-12-13 VITALS — DIASTOLIC BLOOD PRESSURE: 74 MMHG | SYSTOLIC BLOOD PRESSURE: 118 MMHG

## 2017-12-13 VITALS — DIASTOLIC BLOOD PRESSURE: 68 MMHG | SYSTOLIC BLOOD PRESSURE: 136 MMHG

## 2017-12-13 VITALS — SYSTOLIC BLOOD PRESSURE: 129 MMHG | DIASTOLIC BLOOD PRESSURE: 59 MMHG

## 2017-12-13 LAB
ANION GAP SERPL CALC-SCNC: 9 MEQ/L (ref 8–16)
BUN SERPL-MCNC: 18 MG/DL (ref 7–18)
CALCIUM SERPL-MCNC: 8.3 MG/DL (ref 8.8–10.2)
CHLORIDE SERPL-SCNC: 99 MEQ/L (ref 98–107)
CO2 SERPL-SCNC: 30 MEQ/L (ref 21–32)
CREAT SERPL-MCNC: 0.68 MG/DL (ref 0.55–1.02)
ERYTHROCYTE [DISTWIDTH] IN BLOOD BY AUTOMATED COUNT: 15.4 % (ref 11.5–14.5)
FOLATE SERPL-MCNC: 18.9 NG/ML (ref 5.4–?)
GFR SERPL CREATININE-BSD FRML MDRD: > 60 ML/MIN/{1.73_M2} (ref 32–?)
GLUCOSE SERPL-MCNC: 92 MG/DL (ref 83–110)
MCH RBC QN AUTO: 35.5 PG (ref 27–33)
MCHC RBC AUTO-ENTMCNC: 33.7 G/DL (ref 32–36.5)
MCV RBC AUTO: 105.2 FL (ref 80–96)
NRBC BLD AUTO-RTO: 0.1 % (ref 0–0)
PLATELET # BLD AUTO: 282 10^3/UL (ref 150–450)
POTASSIUM SERPL-SCNC: 4.2 MEQ/L (ref 3.5–5.1)
SODIUM SERPL-SCNC: 138 MEQ/L (ref 136–145)
T4 FREE SERPL-MCNC: 1.34 NG/DL (ref 0.76–1.46)
VIT B12 SERPL-MCNC: 1742 PG/ML (ref 247–911)
WBC # BLD AUTO: 17.3 10^3/UL (ref 4–10)

## 2017-12-13 RX ADMIN — PRIMIDONE SCH MG: 50 TABLET ORAL at 09:37

## 2017-12-13 RX ADMIN — TORSEMIDE SCH MG: 20 TABLET ORAL at 16:41

## 2017-12-13 RX ADMIN — METHOTREXATE SODIUM SCH MG: 2.5 TABLET ORAL at 09:36

## 2017-12-13 RX ADMIN — DIVALPROEX SODIUM SCH MG: 500 TABLET, FILM COATED, EXTENDED RELEASE ORAL at 09:38

## 2017-12-13 RX ADMIN — CARBIDOPA AND LEVODOPA SCH TAB: 25; 100 TABLET ORAL at 16:40

## 2017-12-13 RX ADMIN — DIVALPROEX SODIUM SCH MG: 500 TABLET, FILM COATED, EXTENDED RELEASE ORAL at 21:54

## 2017-12-13 RX ADMIN — LORATADINE SCH MG: 10 TABLET ORAL at 09:38

## 2017-12-13 RX ADMIN — METOPROLOL SUCCINATE SCH MG: 25 TABLET, EXTENDED RELEASE ORAL at 21:55

## 2017-12-13 RX ADMIN — ATORVASTATIN CALCIUM SCH MG: 10 TABLET, FILM COATED ORAL at 21:56

## 2017-12-13 RX ADMIN — LEVOFLOXACIN SCH MLS/HR: 5 INJECTION, SOLUTION INTRAVENOUS at 11:02

## 2017-12-13 RX ADMIN — FERROUS SULFATE TAB 325 MG (65 MG ELEMENTAL FE) SCH MG: 325 (65 FE) TAB at 09:39

## 2017-12-13 RX ADMIN — CARBIDOPA AND LEVODOPA SCH TAB: 25; 100 TABLET ORAL at 09:41

## 2017-12-13 RX ADMIN — MULTIPLE VITAMINS W/ MINERALS TAB SCH TAB: TAB at 09:38

## 2017-12-13 RX ADMIN — APIXABAN SCH MG: 2.5 TABLET, FILM COATED ORAL at 21:54

## 2017-12-13 RX ADMIN — CARBIDOPA AND LEVODOPA SCH TAB: 25; 100 TABLET ORAL at 21:54

## 2017-12-13 RX ADMIN — LEVOTHYROXINE SODIUM SCH MCG: 150 TABLET ORAL at 05:53

## 2017-12-13 RX ADMIN — FOLIC ACID SCH MG: 1 TABLET ORAL at 21:54

## 2017-12-13 RX ADMIN — POTASSIUM CHLORIDE SCH MEQ: 750 TABLET, EXTENDED RELEASE ORAL at 21:56

## 2017-12-13 RX ADMIN — OXYBUTYNIN CHLORIDE SCH MG: 5 TABLET, EXTENDED RELEASE ORAL at 09:38

## 2017-12-13 RX ADMIN — DOCUSATE SODIUM SCH MG: 100 CAPSULE, LIQUID FILLED ORAL at 21:53

## 2017-12-13 RX ADMIN — DOCUSATE SODIUM SCH MG: 100 CAPSULE, LIQUID FILLED ORAL at 09:38

## 2017-12-13 RX ADMIN — OMEPRAZOLE SCH MG: 20 CAPSULE, DELAYED RELEASE ORAL at 09:38

## 2017-12-13 RX ADMIN — DEXTROSE MONOHYDRATE SCH MLS/HR: 50 INJECTION, SOLUTION INTRAVENOUS at 16:41

## 2017-12-13 RX ADMIN — PRIMIDONE SCH MG: 50 TABLET ORAL at 21:53

## 2017-12-13 RX ADMIN — APIXABAN SCH MG: 2.5 TABLET, FILM COATED ORAL at 09:39

## 2017-12-13 RX ADMIN — OMEPRAZOLE SCH MG: 20 CAPSULE, DELAYED RELEASE ORAL at 21:56

## 2017-12-13 RX ADMIN — ASPIRIN SCH MG: 81 TABLET ORAL at 09:38

## 2017-12-13 RX ADMIN — CARBIDOPA AND LEVODOPA SCH TAB: 25; 100 TABLET ORAL at 13:44

## 2017-12-13 RX ADMIN — METOPROLOL SUCCINATE SCH MG: 25 TABLET, EXTENDED RELEASE ORAL at 09:40

## 2017-12-13 RX ADMIN — TORSEMIDE SCH MG: 20 TABLET ORAL at 09:40

## 2017-12-13 RX ADMIN — POTASSIUM CHLORIDE SCH MEQ: 750 TABLET, EXTENDED RELEASE ORAL at 09:38

## 2017-12-13 NOTE — REP
Portable chest, 08:32 a.m., single AP view, patient sitting:

 

Comparisons are 12/09/2017, 12/06/2017 and 09/25/2013.

 

There is focal increased radiodensity inferomedially in the right lung compatible

with infiltrate.

 

Remainder the lung fields are clear.  Cardiac size is accentuated by portable

positioning and appears upper normal.  The

 

The laura and mediastinum unremarkable.

 

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

 

Impression:

 

Focal increased radiodensity inferomedially in the right lung compatible with an

infiltrate.

 

 

Signed by

Tay Potts MD 12/13/2017 08:47 A

## 2017-12-13 NOTE — IPNPDOC
Text Note


Date of Service


The patient was seen on 12/13/17.





NOTE


Subjective: The patient has a productive cough. Still requiring 3 L nasal 

cannula. Denies chest pain/shortness of breath. 





Objective: 


Vitals: (see below) 


General: No acute distress, laying comfortably in bed.


HEENT: Moist mucous membranes.


Neck: No JVD or lymphadenopathy


Cardiac: RRR, No murmurs


Pulm: Coarse crackles at the bases b/l. No wheezing. Rhonchi bilaterally. No 

stridor. No use of accessory muscles. 


Abd: NT/ND + BS


Ext: 1+ edema right lower extremity greater than the left. No cyanosis. Distal 

pulses intact.





Labs (see below) 





Images:





Assessment/Plan


1. Sepsis 2/2 PNA, with ? UTI ( Foul smelling urine with + UA) - CXR (see above)

. Started on vanco/Levaquin. CRP/WBC elevated. Blood/sputum/urine cx sent. 


2. Diastolic heart failure improved. ? Asthma, with questionable component of 

restrictive lung disease given history of rheumatoid arthritis. Patient is 

being diuresed on torsemide, with fluid restriction, and daily weights. Also on 

prednisone. Continue nebs. Incentive spirometer. CTA chest on 12/6 negative for 

PE.


3. Nonocclusive DVT in left lower extremity- Dr. Dumont had spoken to the family 

regarding the risks and benefits of anticoagulation and they have opted for  

anticoagulation. She is currently on Eliquis. 


4. Status post hematuria on 12/9. Resolved. Will need outpatient follow-up with 

urology.


5. Generalized weakness secondary to deconditioning- patient encouraged to 

participate with physical therapy


6. Atrial fibrillation- rate is currently controlled. Also on Eliquis. 


7. Parkinson's dz - cont home meds


8. H/o PE s/o coumadin.


9. H/o GI bleed while on coumadin. Hb stable. No bleeding at this time.


10. HLD on statin


11. Hypothyroidism - on synthroid


12. Polymyalgia/RA - cont prednisone. 


13. H/o depression - cont home meds


14. GERD - on PPT





DVT prophy: On Eliquis





VS,Fishbone, I+O


VS, Fishbone, I+O


Laboratory Tests


12/13/17 06:09








Red Blood Count 3.27 L, Mean Corpuscular Volume 105.2 H, Mean Corpuscular 

Hemoglobin 35.5 H, Mean Corpuscular Hemoglobin Concent 33.7, Red Cell 

Distribution Width 15.4 H, Calcium Level 8.3 L








Vital Signs








  Date Time  Temp Pulse Resp B/P (MAP) Pulse Ox O2 Delivery O2 Flow Rate FiO2


 


12/13/17 11:19      Nasal Cannula 3.0 


 


12/13/17 09:40  86  133/66    


 


12/13/17 06:00 98.2  20  95   














I&O- Last 24 Hours up to 6 AM 


 


 12/14/17





 06:00


 


Intake Total 240 ml


 


Output Total 0 ml


 


Balance 240 ml

















ARPAN BARBOSA MD Dec 13, 2017 14:26

## 2017-12-13 NOTE — PHACANCOPD
PHARMACY VANCOMYCIN DOSING


Pt Demographics


Demographics


Patient Age:87 , Weight:108.800  , Gender: female


Adjusted Body Weight


Date: 12/13/17, Adjusted Body Weight:  Kg





Events Past 24 Hours


Events Past 24 Hours:  YES: Elevation in WBC, 


   NO: Dialysis, Diuretic Therapy, Change in CrCl, Fever, Pending Diagnostics, 

Pending Procedures, Other





Vancomycin


Vancomycin Target Ranges:  15-20 mcg/ml


Vancomycin Load Y/N:  Yes


Load Dose Date Time


Vancomycin Load Dose:   2G      Date:  12/13/17       Time:  1500


Vancomycin Dose


Date: 12/13/17. Current Vancomycin Dose: [1G Q12H@1600]


Intermittent Dosing?:  No





Labs


Labs











Item Value  Date Time


 


Blood Culture Received 12/13/17 1003





Blood Venous Pending 


 


White Blood Count 17.3 10^3/uL H 12/13/17 0609


 


White Blood Count 10.4 10^3/uL H 12/12/17 0656


 


White Blood Count 9.1 10^3/uL 12/11/17 0539


 


Creatinine 0.74 MG/DL 12/10/17 0605


 


Creatinine 0.83 MG/DL 12/11/17 0539


 


Creatinine 0.89 MG/DL 12/12/17 0656


 


Creatinine 0.68 MG/DL 12/13/17 0609











 Vital Signs








Label Value  Date Time


 


Patient Temperature 97.8 degrees F 12/13/17 1400


 


Temperature Source Temporal 12/13/17 1400


 


Patient Temperature 98.2 degrees F 12/13/17 0600


 


Temperature Source Temporal 12/13/17 0600


 


Patient Temperature 99.1 degrees F 12/12/17 2200


 


Temperature Source Tympanic 12/12/17 2200








Micro





Microbiology


12/13/17 Blood Culture, Received


           Pending


12/13/17 Blood Culture, Received


           Pending


12/6/17 Blood Culture - Final, Complete


          NO GROWTH AFTER 5 DAYS


12/6/17 Blood Culture - Final, Complete


          NO GROWTH AFTER 5 DAYS


12/13/17 Urine Culture, Received


           Pending


Creatinine Clearance


Date:12/13/17. Creatinine Clearance: .





Assessment and Plan


Maintaining Current Dose?:  Yes


Reason for dose change:  No Dose Change





Pharmacist Note


Pharmacist Note


Date: 12/13/17. Pharmacist note: Pt. presented to ED on 12/6 with increased SOB 

and a non-productive cough for greater than 10 days.  Today patient had a jump 

in WBC. We are treating potential healthcare aquired pneumonia. Pt has no hx of 

vanco or mrsa at our facility.  I have started her with a 2g LD followed by 1G 

Q12H. I have scheduled a trough for before 4th dose.  We will continue to 

monitor and adjust dose as needed.











[facility] Dec 13, 2017 15:19

## 2017-12-14 VITALS — SYSTOLIC BLOOD PRESSURE: 119 MMHG | DIASTOLIC BLOOD PRESSURE: 55 MMHG

## 2017-12-14 VITALS — SYSTOLIC BLOOD PRESSURE: 133 MMHG | DIASTOLIC BLOOD PRESSURE: 66 MMHG

## 2017-12-14 VITALS — DIASTOLIC BLOOD PRESSURE: 62 MMHG | SYSTOLIC BLOOD PRESSURE: 113 MMHG

## 2017-12-14 LAB
ANION GAP SERPL CALC-SCNC: 8 MEQ/L (ref 8–16)
BUN SERPL-MCNC: 18 MG/DL (ref 7–18)
CALCIUM SERPL-MCNC: 7.9 MG/DL (ref 8.8–10.2)
CHLORIDE SERPL-SCNC: 97 MEQ/L (ref 98–107)
CO2 SERPL-SCNC: 33 MEQ/L (ref 21–32)
CREAT SERPL-MCNC: 0.82 MG/DL (ref 0.55–1.02)
ERYTHROCYTE [DISTWIDTH] IN BLOOD BY AUTOMATED COUNT: 15.6 % (ref 11.5–14.5)
GFR SERPL CREATININE-BSD FRML MDRD: > 60 ML/MIN/{1.73_M2} (ref 32–?)
GLUCOSE SERPL-MCNC: 130 MG/DL (ref 83–110)
MAGNESIUM SERPL-MCNC: 2 MG/DL (ref 1.8–2.4)
MCH RBC QN AUTO: 35.8 PG (ref 27–33)
MCHC RBC AUTO-ENTMCNC: 33.9 G/DL (ref 32–36.5)
MCV RBC AUTO: 105.6 FL (ref 80–96)
NRBC BLD AUTO-RTO: 0.2 % (ref 0–0)
PLATELET # BLD AUTO: 356 10^3/UL (ref 150–450)
POTASSIUM SERPL-SCNC: 3.6 MEQ/L (ref 3.5–5.1)
SODIUM SERPL-SCNC: 138 MEQ/L (ref 136–145)
WBC # BLD AUTO: 16.1 10^3/UL (ref 4–10)

## 2017-12-14 RX ADMIN — METOPROLOL SUCCINATE SCH MG: 25 TABLET, EXTENDED RELEASE ORAL at 09:11

## 2017-12-14 RX ADMIN — LEVOTHYROXINE SODIUM SCH MCG: 150 TABLET ORAL at 05:35

## 2017-12-14 RX ADMIN — PRIMIDONE SCH MG: 50 TABLET ORAL at 21:26

## 2017-12-14 RX ADMIN — POTASSIUM CHLORIDE SCH MEQ: 750 TABLET, EXTENDED RELEASE ORAL at 09:10

## 2017-12-14 RX ADMIN — TORSEMIDE SCH MG: 20 TABLET ORAL at 09:12

## 2017-12-14 RX ADMIN — TORSEMIDE SCH MG: 20 TABLET ORAL at 16:20

## 2017-12-14 RX ADMIN — PRIMIDONE SCH MG: 50 TABLET ORAL at 09:12

## 2017-12-14 RX ADMIN — APIXABAN SCH MG: 2.5 TABLET, FILM COATED ORAL at 21:30

## 2017-12-14 RX ADMIN — CARBIDOPA AND LEVODOPA SCH TAB: 25; 100 TABLET ORAL at 12:54

## 2017-12-14 RX ADMIN — CARBIDOPA AND LEVODOPA SCH TAB: 25; 100 TABLET ORAL at 21:37

## 2017-12-14 RX ADMIN — MULTIPLE VITAMINS W/ MINERALS TAB SCH TAB: TAB at 09:10

## 2017-12-14 RX ADMIN — OMEPRAZOLE SCH MG: 20 CAPSULE, DELAYED RELEASE ORAL at 21:29

## 2017-12-14 RX ADMIN — OMEPRAZOLE SCH MG: 20 CAPSULE, DELAYED RELEASE ORAL at 09:11

## 2017-12-14 RX ADMIN — DOCUSATE SODIUM SCH MG: 100 CAPSULE, LIQUID FILLED ORAL at 09:10

## 2017-12-14 RX ADMIN — CARBIDOPA AND LEVODOPA SCH TAB: 25; 100 TABLET ORAL at 16:20

## 2017-12-14 RX ADMIN — METOPROLOL SUCCINATE SCH MG: 25 TABLET, EXTENDED RELEASE ORAL at 21:00

## 2017-12-14 RX ADMIN — LORATADINE SCH MG: 10 TABLET ORAL at 09:12

## 2017-12-14 RX ADMIN — FOLIC ACID SCH MG: 1 TABLET ORAL at 21:30

## 2017-12-14 RX ADMIN — CARBIDOPA AND LEVODOPA SCH TAB: 25; 100 TABLET ORAL at 09:12

## 2017-12-14 RX ADMIN — OXYBUTYNIN CHLORIDE SCH MG: 5 TABLET, EXTENDED RELEASE ORAL at 09:10

## 2017-12-14 RX ADMIN — APIXABAN SCH MG: 2.5 TABLET, FILM COATED ORAL at 09:12

## 2017-12-14 RX ADMIN — FERROUS SULFATE TAB 325 MG (65 MG ELEMENTAL FE) SCH MG: 325 (65 FE) TAB at 09:10

## 2017-12-14 RX ADMIN — DIVALPROEX SODIUM SCH MG: 500 TABLET, FILM COATED, EXTENDED RELEASE ORAL at 09:12

## 2017-12-14 RX ADMIN — LEVOFLOXACIN SCH MLS/HR: 5 INJECTION, SOLUTION INTRAVENOUS at 09:10

## 2017-12-14 RX ADMIN — POTASSIUM CHLORIDE SCH MEQ: 750 TABLET, EXTENDED RELEASE ORAL at 21:26

## 2017-12-14 RX ADMIN — DIVALPROEX SODIUM SCH MG: 500 TABLET, FILM COATED, EXTENDED RELEASE ORAL at 21:29

## 2017-12-14 RX ADMIN — ATORVASTATIN CALCIUM SCH MG: 10 TABLET, FILM COATED ORAL at 21:30

## 2017-12-14 RX ADMIN — ASPIRIN SCH MG: 81 TABLET ORAL at 09:10

## 2017-12-14 RX ADMIN — DOCUSATE SODIUM SCH MG: 100 CAPSULE, LIQUID FILLED ORAL at 21:26

## 2017-12-14 RX ADMIN — DEXTROSE MONOHYDRATE SCH MLS/HR: 50 INJECTION, SOLUTION INTRAVENOUS at 16:20

## 2017-12-14 RX ADMIN — DEXTROSE MONOHYDRATE SCH MLS/HR: 50 INJECTION, SOLUTION INTRAVENOUS at 03:59

## 2017-12-14 NOTE — IPNPDOC
Text Note


Date of Service


The patient was seen on 12/14/17.





NOTE


Subjective: Has productive cough. Denies chest pain/shortness of breath/

palpitations. 





Objective: 


Vitals: (see below) 


General: No acute distress, laying comfortably in bed.


HEENT: Moist mucous membranes.


Neck: No JVD or lymphadenopathy


Cardiac: RRR, No murmurs


Pulm: Coarse crackles at the bases b/l. No wheezing. Rhonchi bilaterally. No 

stridor. No use of accessory muscles. 


Abd: NT/ND + BS


Ext: 1+ edema right lower extremity greater than the left. No cyanosis. Distal 

pulses intact.





Labs (see below) 





Images:





Assessment/Plan


1. Sepsis 2/2 PNA, with ? UTI ( Foul smelling urine with + UA) - CXR (see above)

. Started on vanco/Levaquin. CRP/WBC elevated. Blood/sputum/urine cx sent. 


2. Diastolic heart failure improved. ? Asthma, with questionable component of 

restrictive lung disease given history of rheumatoid arthritis. Patient is 

being diuresed on torsemide, dose decreased. Fluid restriction, and daily 

weights. Also on prednisone. Continue nebs. Incentive spirometer. CTA chest on 

12/6 negative for PE.


3. Nonocclusive DVT in left lower extremity- Dr. Dumont had spoken to the family 

regarding the risks and benefits of anticoagulation and they have opted for  

anticoagulation. She is currently on Eliquis. 


4. Status post hematuria on 12/9. Resolved. Will need outpatient follow-up with 

urology.


5. Generalized weakness secondary to deconditioning- patient encouraged to 

participate with physical therapy


6. Atrial fibrillation- rate is currently controlled. Also on Eliquis. 


7. Parkinson's dz - cont home meds


8. H/o PE s/o coumadin.


9. H/o GI bleed while on coumadin. Hb stable. No bleeding at this time.


10. HLD on statin


11. Hypothyroidism - on synthroid


12. Polymyalgia/RA - cont prednisone. 


13. H/o depression - cont home meds


14. GERD - on PPT





DVT prophy: On Eliquis





VS,Fishbone, I+O


VS, Fishbone, I+O


Laboratory Tests


12/14/17 08:24








Red Blood Count 3.38 L, Mean Corpuscular Volume 105.6 H, Mean Corpuscular 

Hemoglobin 35.8 H, Mean Corpuscular Hemoglobin Concent 33.9, Red Cell 

Distribution Width 15.6 H, Calcium Level 7.9 L








Vital Signs








  Date Time  Temp Pulse Resp B/P (MAP) Pulse Ox O2 Delivery O2 Flow Rate FiO2


 


12/14/17 09:11  87  119/55    


 


12/14/17 09:00      Nasal Cannula 3.0 


 


12/14/17 06:00 98.0  15  90   














I&O- Last 24 Hours up to 6 AM 


 


 12/15/17





 06:00


 


Intake Total 390 ml


 


Output Total 0 ml


 


Balance 390 ml

















ARPAN BARBOSA MD Dec 14, 2017 13:44

## 2017-12-15 VITALS — SYSTOLIC BLOOD PRESSURE: 116 MMHG | DIASTOLIC BLOOD PRESSURE: 59 MMHG

## 2017-12-15 VITALS — DIASTOLIC BLOOD PRESSURE: 60 MMHG | SYSTOLIC BLOOD PRESSURE: 129 MMHG

## 2017-12-15 VITALS — DIASTOLIC BLOOD PRESSURE: 63 MMHG | SYSTOLIC BLOOD PRESSURE: 120 MMHG

## 2017-12-15 LAB
ANION GAP SERPL CALC-SCNC: 3 MEQ/L (ref 8–16)
BUN SERPL-MCNC: 18 MG/DL (ref 7–18)
CALCIUM SERPL-MCNC: 8.5 MG/DL (ref 8.8–10.2)
CHLORIDE SERPL-SCNC: 98 MEQ/L (ref 98–107)
CO2 SERPL-SCNC: 38 MEQ/L (ref 21–32)
CREAT SERPL-MCNC: 0.71 MG/DL (ref 0.55–1.02)
ERYTHROCYTE [DISTWIDTH] IN BLOOD BY AUTOMATED COUNT: 15.3 % (ref 11.5–14.5)
GFR SERPL CREATININE-BSD FRML MDRD: > 60 ML/MIN/{1.73_M2} (ref 32–?)
GLUCOSE SERPL-MCNC: 85 MG/DL (ref 83–110)
MCH RBC QN AUTO: 35.1 PG (ref 27–33)
MCHC RBC AUTO-ENTMCNC: 33.1 G/DL (ref 32–36.5)
MCV RBC AUTO: 105.8 FL (ref 80–96)
NRBC BLD AUTO-RTO: 0.4 % (ref 0–0)
PLATELET # BLD AUTO: 353 10^3/UL (ref 150–450)
POTASSIUM SERPL-SCNC: 3.7 MEQ/L (ref 3.5–5.1)
SODIUM SERPL-SCNC: 139 MEQ/L (ref 136–145)
WBC # BLD AUTO: 12 10^3/UL (ref 4–10)

## 2017-12-15 RX ADMIN — TORSEMIDE SCH MG: 20 TABLET ORAL at 16:54

## 2017-12-15 RX ADMIN — APIXABAN SCH MG: 2.5 TABLET, FILM COATED ORAL at 09:06

## 2017-12-15 RX ADMIN — APIXABAN SCH MG: 2.5 TABLET, FILM COATED ORAL at 21:53

## 2017-12-15 RX ADMIN — ATORVASTATIN CALCIUM SCH MG: 10 TABLET, FILM COATED ORAL at 21:54

## 2017-12-15 RX ADMIN — TORSEMIDE SCH MG: 20 TABLET ORAL at 09:07

## 2017-12-15 RX ADMIN — PRIMIDONE SCH MG: 50 TABLET ORAL at 09:12

## 2017-12-15 RX ADMIN — LEVOTHYROXINE SODIUM SCH MCG: 150 TABLET ORAL at 06:14

## 2017-12-15 RX ADMIN — POTASSIUM CHLORIDE SCH MEQ: 750 TABLET, EXTENDED RELEASE ORAL at 09:07

## 2017-12-15 RX ADMIN — DIVALPROEX SODIUM SCH MG: 500 TABLET, FILM COATED, EXTENDED RELEASE ORAL at 21:54

## 2017-12-15 RX ADMIN — DEXTROSE MONOHYDRATE SCH MLS/HR: 50 INJECTION, SOLUTION INTRAVENOUS at 16:54

## 2017-12-15 RX ADMIN — FOLIC ACID SCH MG: 1 TABLET ORAL at 21:54

## 2017-12-15 RX ADMIN — METOPROLOL SUCCINATE SCH MG: 25 TABLET, EXTENDED RELEASE ORAL at 09:09

## 2017-12-15 RX ADMIN — PRIMIDONE SCH MG: 50 TABLET ORAL at 21:53

## 2017-12-15 RX ADMIN — CARBIDOPA AND LEVODOPA SCH TAB: 25; 100 TABLET ORAL at 13:51

## 2017-12-15 RX ADMIN — CARBIDOPA AND LEVODOPA SCH TAB: 25; 100 TABLET ORAL at 21:54

## 2017-12-15 RX ADMIN — DOCUSATE SODIUM SCH MG: 100 CAPSULE, LIQUID FILLED ORAL at 21:54

## 2017-12-15 RX ADMIN — LORATADINE SCH MG: 10 TABLET ORAL at 09:06

## 2017-12-15 RX ADMIN — OMEPRAZOLE SCH MG: 20 CAPSULE, DELAYED RELEASE ORAL at 21:54

## 2017-12-15 RX ADMIN — ASPIRIN SCH MG: 81 TABLET ORAL at 09:08

## 2017-12-15 RX ADMIN — CARBIDOPA AND LEVODOPA SCH TAB: 25; 100 TABLET ORAL at 09:08

## 2017-12-15 RX ADMIN — CARBIDOPA AND LEVODOPA SCH TAB: 25; 100 TABLET ORAL at 16:54

## 2017-12-15 RX ADMIN — OXYBUTYNIN CHLORIDE SCH MG: 5 TABLET, EXTENDED RELEASE ORAL at 09:08

## 2017-12-15 RX ADMIN — DEXTROSE MONOHYDRATE SCH MLS/HR: 50 INJECTION, SOLUTION INTRAVENOUS at 03:49

## 2017-12-15 RX ADMIN — DIVALPROEX SODIUM SCH MG: 500 TABLET, FILM COATED, EXTENDED RELEASE ORAL at 09:07

## 2017-12-15 RX ADMIN — FERROUS SULFATE TAB 325 MG (65 MG ELEMENTAL FE) SCH MG: 325 (65 FE) TAB at 09:08

## 2017-12-15 RX ADMIN — MULTIPLE VITAMINS W/ MINERALS TAB SCH TAB: TAB at 09:09

## 2017-12-15 RX ADMIN — METOPROLOL SUCCINATE SCH MG: 25 TABLET, EXTENDED RELEASE ORAL at 21:55

## 2017-12-15 RX ADMIN — OMEPRAZOLE SCH MG: 20 CAPSULE, DELAYED RELEASE ORAL at 09:06

## 2017-12-15 RX ADMIN — POTASSIUM CHLORIDE SCH MEQ: 750 TABLET, EXTENDED RELEASE ORAL at 21:54

## 2017-12-15 RX ADMIN — LEVOFLOXACIN SCH MLS/HR: 5 INJECTION, SOLUTION INTRAVENOUS at 11:19

## 2017-12-15 RX ADMIN — DOCUSATE SODIUM SCH MG: 100 CAPSULE, LIQUID FILLED ORAL at 09:08

## 2017-12-15 NOTE — IPNPDOC
Text Note


Date of Service


The patient was seen on 12/15/17.





NOTE


Subjective: Still has productive cough. Dyspnea improving. Denies chest pain/

shortness of breath/palpitations. 





Objective: 


Vitals: (see below) 


General: No acute distress, laying comfortably in bed.


HEENT: Moist mucous membranes.


Neck: No JVD or lymphadenopathy


Cardiac: RRR, No murmurs


Pulm: Coarse crackles at the bases b/l. No wheezing. No stridor. No use of 

accessory muscles. 


Abd: NT/ND + BS


Ext: 1+ edema right lower extremity greater than the left. No cyanosis. Distal 

pulses intact.





Labs (see below) 





Images:





Assessment/Plan


1. Sepsis 2/2 PNA, with ? UTI ( Foul smelling urine with + UA) - CXR (see above)

. Started on vanco/Levaquin. CRP/WBC elevated. Blood/sputum/urine cx sent. 


2. Diastolic heart failure improved. ? Asthma, with questionable component of 

restrictive lung disease given history of rheumatoid arthritis. Patient is 

being diuresed on torsemide, dose decreased. Fluid restriction, and daily 

weights. Also on prednisone. Continue nebs. Incentive spirometer. CTA chest on 

12/6 negative for PE.


3. Nonocclusive DVT in left lower extremity- Dr. Dumont had spoken to the family 

regarding the risks and benefits of anticoagulation and they have opted for  

anticoagulation. She is currently on Eliquis. 


4. Status post hematuria on 12/9. Resolved. Will need outpatient follow-up with 

urology.


5. Generalized weakness secondary to deconditioning- patient encouraged to 

participate with physical therapy


6. Atrial fibrillation- rate is currently controlled. Also on Eliquis. 


7. Parkinson's dz - cont home meds


8. H/o PE s/o coumadin.


9. H/o GI bleed while on coumadin. Hb stable. No bleeding at this time.


10. HLD on statin


11. Hypothyroidism - on synthroid


12. Polymyalgia/RA - cont prednisone. 


13. H/o depression - cont home meds


14. GERD - on PPT





DVT prophy: On Eliquis





VS,Fishbone, I+O


VS, Fishbone, I+O


Laboratory Tests


12/15/17 07:38








Red Blood Count 3.25 L, Mean Corpuscular Volume 105.8 H, Mean Corpuscular 

Hemoglobin 35.1 H, Mean Corpuscular Hemoglobin Concent 33.1, Red Cell 

Distribution Width 15.3 H, Calcium Level 8.5 L








Vital Signs








  Date Time  Temp Pulse Resp B/P (MAP) Pulse Ox O2 Delivery O2 Flow Rate FiO2


 


12/15/17 09:09  65  129/60    


 


12/15/17 09:00      Nasal Cannula 3.0 


 


12/15/17 06:00 97.4  15  93   














I&O- Last 24 Hours up to 6 AM 


 


 12/16/17





 06:00


 


Intake Total 1030 ml


 


Output Total 0 ml


 


Balance 1030 ml

















ARPAN BARBOSA MD Dec 15, 2017 14:32

## 2017-12-15 NOTE — PHACANCOPD
PHARMACY VANCOMYCIN DOSING


Pt Demographics


Demographics


Patient Age:87 , Weight:101.600  , Gender: female


Adjusted Body Weight


Date: 12/13/17, Adjusted Body Weight:  Kg





Events Past 24 Hours


Events Past 24 Hours:  NO: Dialysis, Diuretic Therapy, Change in CrCl, Fever, 

Elevation in WBC, Pending Diagnostics, Pending Procedures, Other





Vancomycin


Vancomycin Target Ranges:  15-20 mcg/ml


Vancomycin Load Y/N:  Yes


Load Dose Date Time


Vancomycin Load Dose:   2G      Date:  12/13/17       Time:  1500


Vancomycin Dose


Date: 12/13/17. Current Vancomycin Dose: [1G Q12H@1600]


Intermittent Dosing?:  No





Labs


Labs





 Vital Signs








Label Value  Date Time


 


Patient Temperature 98.4 degrees F 12/14/17 2200


 


Temperature Source Temporal 12/14/17 2200


 


Patient Temperature 97.4 degrees F 12/15/17 0600


 


Temperature Source Temporal 12/15/17 0600


 


Patient Temperature 98.3 degrees F 12/15/17 1400


 


Temperature Source Temporal 12/15/17 1400














Item Value  Date Time


 


White Blood Count 17.3 10^3/uL H 12/13/17 0609


 


White Blood Count 16.1 10^3/uL H 12/14/17 0824


 


White Blood Count 12.0 10^3/uL H 12/15/17 0738


 


Vancomycin Level Trough 18.8 UG/ML 12/15/17 1451


 


Creatinine 0.68 MG/DL 12/13/17 0609


 


Creatinine 0.82 MG/DL 12/14/17 0824


 


Creatinine 0.71 MG/DL 12/15/17 0738








Micro





Microbiology


12/13/17 Blood Culture - Preliminary, Resulted


           No Growth after 48 hours. All Specime...


12/13/17 Blood Culture - Preliminary, Resulted


           No Growth after 48 hours. All Specime...


12/6/17 Blood Culture - Final, Complete


          NO GROWTH AFTER 5 DAYS


12/6/17 Blood Culture - Final, Complete


          NO GROWTH AFTER 5 DAYS


12/14/17 Gram Stain - Final, Resulted


           


12/14/17 Sputum Culture, Resulted


           Pending


12/13/17 Urine Culture - Final, Complete


           Yeast Like Organism


Creatinine Clearance


Date:12/13/17. Creatinine Clearance: .





Assessment and Plan


Maintaining Current Dose?:  Yes


Reason for dose change:  No Dose Change





Pharmacist Note


Pharmacist Note


12/15/17: Trough drawn today @1451 before 5th dose @1600 came back at 18.8mcg/

ml. This is within our goal range of 15-20. At this time we will continue 

current dose of 1G Q12H. We will continue to monitor and adjust as needed.





Date: 12/13/17. Pharmacist note: Pt. presented to ED on 12/6 with increased SOB 

and a non-productive cough for greater than 10 days.  Today patient had a jump 

in WBC. We are treating potential healthcare aquired pneumonia. Pt has no hx of 

vanco or mrsa at our facility.  I have started her with a 2g LD followed by 1G 

Q12H. I have scheduled a trough for before 4th dose.  We will continue to 

monitor and adjust dose as needed.











PRESTON SIDDIQI [facility] Dec 15, 2017 15:55

## 2017-12-16 VITALS — SYSTOLIC BLOOD PRESSURE: 121 MMHG | DIASTOLIC BLOOD PRESSURE: 60 MMHG

## 2017-12-16 VITALS — SYSTOLIC BLOOD PRESSURE: 113 MMHG | DIASTOLIC BLOOD PRESSURE: 57 MMHG

## 2017-12-16 VITALS — SYSTOLIC BLOOD PRESSURE: 121 MMHG | DIASTOLIC BLOOD PRESSURE: 58 MMHG

## 2017-12-16 VITALS — SYSTOLIC BLOOD PRESSURE: 132 MMHG | DIASTOLIC BLOOD PRESSURE: 62 MMHG

## 2017-12-16 LAB
ANION GAP SERPL CALC-SCNC: 5 MEQ/L (ref 8–16)
BUN SERPL-MCNC: 24 MG/DL (ref 7–18)
CALCIUM SERPL-MCNC: 8.5 MG/DL (ref 8.8–10.2)
CHLORIDE SERPL-SCNC: 98 MEQ/L (ref 98–107)
CO2 SERPL-SCNC: 34 MEQ/L (ref 21–32)
CREAT SERPL-MCNC: 0.68 MG/DL (ref 0.55–1.02)
ERYTHROCYTE [DISTWIDTH] IN BLOOD BY AUTOMATED COUNT: 15.1 % (ref 11.5–14.5)
GFR SERPL CREATININE-BSD FRML MDRD: > 60 ML/MIN/{1.73_M2} (ref 32–?)
GLUCOSE SERPL-MCNC: 151 MG/DL (ref 83–110)
MCH RBC QN AUTO: 35 PG (ref 27–33)
MCHC RBC AUTO-ENTMCNC: 32.9 G/DL (ref 32–36.5)
MCV RBC AUTO: 106.2 FL (ref 80–96)
NRBC BLD AUTO-RTO: 0 % (ref 0–0)
PLATELET # BLD AUTO: 348 10^3/UL (ref 150–450)
POTASSIUM SERPL-SCNC: 4.1 MEQ/L (ref 3.5–5.1)
SODIUM SERPL-SCNC: 137 MEQ/L (ref 136–145)
WBC # BLD AUTO: 11.3 10^3/UL (ref 4–10)

## 2017-12-16 RX ADMIN — LORATADINE SCH MG: 10 TABLET ORAL at 09:20

## 2017-12-16 RX ADMIN — POTASSIUM CHLORIDE SCH MEQ: 750 TABLET, EXTENDED RELEASE ORAL at 21:36

## 2017-12-16 RX ADMIN — APIXABAN SCH MG: 2.5 TABLET, FILM COATED ORAL at 21:36

## 2017-12-16 RX ADMIN — DIVALPROEX SODIUM SCH MG: 500 TABLET, FILM COATED, EXTENDED RELEASE ORAL at 21:36

## 2017-12-16 RX ADMIN — ATORVASTATIN CALCIUM SCH MG: 10 TABLET, FILM COATED ORAL at 21:38

## 2017-12-16 RX ADMIN — TORSEMIDE SCH MG: 20 TABLET ORAL at 09:20

## 2017-12-16 RX ADMIN — APIXABAN SCH MG: 2.5 TABLET, FILM COATED ORAL at 09:20

## 2017-12-16 RX ADMIN — PRIMIDONE SCH MG: 50 TABLET ORAL at 09:21

## 2017-12-16 RX ADMIN — DOCUSATE SODIUM SCH MG: 100 CAPSULE, LIQUID FILLED ORAL at 09:19

## 2017-12-16 RX ADMIN — CARBIDOPA AND LEVODOPA SCH TAB: 25; 100 TABLET ORAL at 16:26

## 2017-12-16 RX ADMIN — DOCUSATE SODIUM SCH MG: 100 CAPSULE, LIQUID FILLED ORAL at 21:36

## 2017-12-16 RX ADMIN — PRIMIDONE SCH MG: 50 TABLET ORAL at 21:35

## 2017-12-16 RX ADMIN — CARBIDOPA AND LEVODOPA SCH TAB: 25; 100 TABLET ORAL at 09:20

## 2017-12-16 RX ADMIN — METOPROLOL SUCCINATE SCH MG: 25 TABLET, EXTENDED RELEASE ORAL at 09:21

## 2017-12-16 RX ADMIN — OMEPRAZOLE SCH MG: 20 CAPSULE, DELAYED RELEASE ORAL at 21:36

## 2017-12-16 RX ADMIN — METOPROLOL SUCCINATE SCH MG: 25 TABLET, EXTENDED RELEASE ORAL at 21:37

## 2017-12-16 RX ADMIN — FOLIC ACID SCH MG: 1 TABLET ORAL at 21:36

## 2017-12-16 RX ADMIN — OXYBUTYNIN CHLORIDE SCH MG: 5 TABLET, EXTENDED RELEASE ORAL at 09:19

## 2017-12-16 RX ADMIN — LEVOFLOXACIN SCH MLS/HR: 5 INJECTION, SOLUTION INTRAVENOUS at 09:22

## 2017-12-16 RX ADMIN — TORSEMIDE SCH MG: 20 TABLET ORAL at 16:26

## 2017-12-16 RX ADMIN — ASPIRIN SCH MG: 81 TABLET ORAL at 09:20

## 2017-12-16 RX ADMIN — MULTIPLE VITAMINS W/ MINERALS TAB SCH TAB: TAB at 09:19

## 2017-12-16 RX ADMIN — DIVALPROEX SODIUM SCH MG: 500 TABLET, FILM COATED, EXTENDED RELEASE ORAL at 09:19

## 2017-12-16 RX ADMIN — LEVOTHYROXINE SODIUM SCH MCG: 150 TABLET ORAL at 05:33

## 2017-12-16 RX ADMIN — CARBIDOPA AND LEVODOPA SCH TAB: 25; 100 TABLET ORAL at 21:38

## 2017-12-16 RX ADMIN — CARBIDOPA AND LEVODOPA SCH TAB: 25; 100 TABLET ORAL at 12:12

## 2017-12-16 RX ADMIN — DEXTROSE MONOHYDRATE SCH MLS/HR: 50 INJECTION, SOLUTION INTRAVENOUS at 16:26

## 2017-12-16 RX ADMIN — FERROUS SULFATE TAB 325 MG (65 MG ELEMENTAL FE) SCH MG: 325 (65 FE) TAB at 09:20

## 2017-12-16 RX ADMIN — DEXTROSE MONOHYDRATE SCH MLS/HR: 50 INJECTION, SOLUTION INTRAVENOUS at 04:07

## 2017-12-16 RX ADMIN — POTASSIUM CHLORIDE SCH MEQ: 750 TABLET, EXTENDED RELEASE ORAL at 09:21

## 2017-12-16 RX ADMIN — OMEPRAZOLE SCH MG: 20 CAPSULE, DELAYED RELEASE ORAL at 09:20

## 2017-12-16 NOTE — IPNPDOC
Text Note


Date of Service


The patient was seen on 12/16/17.





NOTE


Subjective: Dyspnea improving. Encouraged to use incentive spirometer and get 

out of bed. 





Objective: 


Vitals: (see below) 


General: No acute distress, laying comfortably in bed.


HEENT: Moist mucous membranes.


Neck: No JVD or lymphadenopathy


Cardiac: RRR, No murmurs


Pulm: Coarse crackles at the bases b/l. No wheezing. No stridor. No use of 

accessory muscles. 


Abd: NT/ND + BS


Ext: 1+ edema right lower extremity greater than the left. No cyanosis. Distal 

pulses intact.





Labs (see below) 





Images:





Assessment/Plan


1. Sepsis 2/2 PNA, with ? UTI ( Foul smelling urine with + UA) - CXR (see above)

. Started on vanco/Levaquin. CRP/WBC improving. Blood/sputum/urine cx sent. 


2. Diastolic heart failure improved. ? Asthma, with questionable component of 

restrictive lung disease given history of rheumatoid arthritis. Patient is 

being diuresed on torsemide, dose decreased. Fluid restriction, and daily 

weights. Also on prednisone. Continue nebs. Incentive spirometer. CTA chest on 

12/6 negative for PE.


3. Nonocclusive DVT in left lower extremity- Dr. Dumont had spoken to the family 

regarding the risks and benefits of anticoagulation and they have opted for  

anticoagulation. She is currently on Eliquis. 


4. Status post hematuria on 12/9. Resolved. Will need outpatient follow-up with 

urology.


5. Generalized weakness secondary to deconditioning- patient encouraged to 

participate with physical therapy


6. Atrial fibrillation- rate is currently controlled. Also on Eliquis. 


7. Parkinson's dz - cont home meds


8. H/o PE s/o coumadin.


9. H/o GI bleed while on coumadin. Hb stable. No bleeding at this time.


10. HLD on statin


11. Hypothyroidism - on synthroid


12. Polymyalgia/RA - cont prednisone. 


13. H/o depression - cont home meds


14. GERD - on PPT





DVT prophy: On Eliquis





Will likely need subacute rehab.





VS,Fishbone, I+O


VS, Fishbone, I+O


Laboratory Tests


12/16/17 05:00








Red Blood Count 3.06 L, Mean Corpuscular Volume 106.2 H, Mean Corpuscular 

Hemoglobin 35.0 H, Mean Corpuscular Hemoglobin Concent 32.9, Red Cell 

Distribution Width 15.1 H, Calcium Level 8.5 L








Vital Signs








  Date Time  Temp Pulse Resp B/P (MAP) Pulse Ox O2 Delivery O2 Flow Rate FiO2


 


12/16/17 09:21  72  132/62    


 


12/16/17 06:00 98.7  19  94 Nasal Cannula 3.0 

















ARPAN BARBOSA MD Dec 16, 2017 10:49

## 2017-12-17 VITALS — SYSTOLIC BLOOD PRESSURE: 113 MMHG | DIASTOLIC BLOOD PRESSURE: 56 MMHG

## 2017-12-17 VITALS — DIASTOLIC BLOOD PRESSURE: 61 MMHG | SYSTOLIC BLOOD PRESSURE: 130 MMHG

## 2017-12-17 VITALS — SYSTOLIC BLOOD PRESSURE: 101 MMHG | DIASTOLIC BLOOD PRESSURE: 61 MMHG

## 2017-12-17 VITALS — SYSTOLIC BLOOD PRESSURE: 118 MMHG | DIASTOLIC BLOOD PRESSURE: 56 MMHG

## 2017-12-17 LAB
ANION GAP SERPL CALC-SCNC: 7 MEQ/L (ref 8–16)
BUN SERPL-MCNC: 23 MG/DL (ref 7–18)
CALCIUM SERPL-MCNC: 8.2 MG/DL (ref 8.8–10.2)
CHLORIDE SERPL-SCNC: 100 MEQ/L (ref 98–107)
CO2 SERPL-SCNC: 32 MEQ/L (ref 21–32)
CREAT SERPL-MCNC: 0.74 MG/DL (ref 0.55–1.02)
ERYTHROCYTE [DISTWIDTH] IN BLOOD BY AUTOMATED COUNT: 15.1 % (ref 11.5–14.5)
GFR SERPL CREATININE-BSD FRML MDRD: > 60 ML/MIN/{1.73_M2} (ref 32–?)
GLUCOSE SERPL-MCNC: 146 MG/DL (ref 83–110)
MCH RBC QN AUTO: 35.3 PG (ref 27–33)
MCHC RBC AUTO-ENTMCNC: 33.1 G/DL (ref 32–36.5)
MCV RBC AUTO: 106.5 FL (ref 80–96)
NRBC BLD AUTO-RTO: 0 % (ref 0–0)
PLATELET # BLD AUTO: 378 10^3/UL (ref 150–450)
POTASSIUM SERPL-SCNC: 4.6 MEQ/L (ref 3.5–5.1)
SODIUM SERPL-SCNC: 139 MEQ/L (ref 136–145)
WBC # BLD AUTO: 10.9 10^3/UL (ref 4–10)

## 2017-12-17 RX ADMIN — ASPIRIN SCH MG: 81 TABLET ORAL at 09:55

## 2017-12-17 RX ADMIN — POTASSIUM CHLORIDE SCH MEQ: 750 TABLET, EXTENDED RELEASE ORAL at 21:53

## 2017-12-17 RX ADMIN — DOCUSATE SODIUM SCH MG: 100 CAPSULE, LIQUID FILLED ORAL at 12:59

## 2017-12-17 RX ADMIN — TORSEMIDE SCH MG: 20 TABLET ORAL at 09:56

## 2017-12-17 RX ADMIN — METOPROLOL SUCCINATE SCH MG: 25 TABLET, EXTENDED RELEASE ORAL at 09:57

## 2017-12-17 RX ADMIN — TORSEMIDE SCH MG: 20 TABLET ORAL at 16:41

## 2017-12-17 RX ADMIN — OMEPRAZOLE SCH MG: 20 CAPSULE, DELAYED RELEASE ORAL at 21:54

## 2017-12-17 RX ADMIN — CARBIDOPA AND LEVODOPA SCH TAB: 25; 100 TABLET ORAL at 12:59

## 2017-12-17 RX ADMIN — FERROUS SULFATE TAB 325 MG (65 MG ELEMENTAL FE) SCH MG: 325 (65 FE) TAB at 09:56

## 2017-12-17 RX ADMIN — LEVOTHYROXINE SODIUM SCH MCG: 150 TABLET ORAL at 06:09

## 2017-12-17 RX ADMIN — APIXABAN SCH MG: 2.5 TABLET, FILM COATED ORAL at 09:56

## 2017-12-17 RX ADMIN — ATORVASTATIN CALCIUM SCH MG: 10 TABLET, FILM COATED ORAL at 21:54

## 2017-12-17 RX ADMIN — DOCUSATE SODIUM SCH MG: 100 CAPSULE, LIQUID FILLED ORAL at 21:00

## 2017-12-17 RX ADMIN — APIXABAN SCH MG: 2.5 TABLET, FILM COATED ORAL at 21:52

## 2017-12-17 RX ADMIN — POLYETHYLENE GLYCOL 3350 SCH PKT: 17 POWDER, FOR SOLUTION ORAL at 13:00

## 2017-12-17 RX ADMIN — MULTIPLE VITAMINS W/ MINERALS TAB SCH TAB: TAB at 09:56

## 2017-12-17 RX ADMIN — CARBIDOPA AND LEVODOPA SCH TAB: 25; 100 TABLET ORAL at 09:55

## 2017-12-17 RX ADMIN — DIVALPROEX SODIUM SCH MG: 500 TABLET, FILM COATED, EXTENDED RELEASE ORAL at 21:53

## 2017-12-17 RX ADMIN — DOCUSATE SODIUM SCH MG: 100 CAPSULE, LIQUID FILLED ORAL at 09:56

## 2017-12-17 RX ADMIN — PRIMIDONE SCH MG: 50 TABLET ORAL at 21:54

## 2017-12-17 RX ADMIN — DEXTROSE MONOHYDRATE SCH MLS/HR: 50 INJECTION, SOLUTION INTRAVENOUS at 16:41

## 2017-12-17 RX ADMIN — CARBIDOPA AND LEVODOPA SCH TAB: 25; 100 TABLET ORAL at 16:41

## 2017-12-17 RX ADMIN — POLYETHYLENE GLYCOL 3350 SCH PKT: 17 POWDER, FOR SOLUTION ORAL at 21:52

## 2017-12-17 RX ADMIN — OMEPRAZOLE SCH MG: 20 CAPSULE, DELAYED RELEASE ORAL at 09:56

## 2017-12-17 RX ADMIN — POTASSIUM CHLORIDE SCH MEQ: 750 TABLET, EXTENDED RELEASE ORAL at 09:57

## 2017-12-17 RX ADMIN — OXYBUTYNIN CHLORIDE SCH MG: 5 TABLET, EXTENDED RELEASE ORAL at 09:57

## 2017-12-17 RX ADMIN — LORATADINE SCH MG: 10 TABLET ORAL at 09:55

## 2017-12-17 RX ADMIN — METOPROLOL SUCCINATE SCH MG: 25 TABLET, EXTENDED RELEASE ORAL at 21:00

## 2017-12-17 RX ADMIN — DOCUSATE SODIUM SCH MG: 100 CAPSULE, LIQUID FILLED ORAL at 21:54

## 2017-12-17 RX ADMIN — DEXTROSE MONOHYDRATE SCH MLS/HR: 50 INJECTION, SOLUTION INTRAVENOUS at 04:03

## 2017-12-17 RX ADMIN — PRIMIDONE SCH MG: 50 TABLET ORAL at 09:55

## 2017-12-17 RX ADMIN — FOLIC ACID SCH MG: 1 TABLET ORAL at 21:52

## 2017-12-17 RX ADMIN — DIVALPROEX SODIUM SCH MG: 500 TABLET, FILM COATED, EXTENDED RELEASE ORAL at 09:55

## 2017-12-17 RX ADMIN — CARBIDOPA AND LEVODOPA SCH TAB: 25; 100 TABLET ORAL at 21:53

## 2017-12-17 NOTE — IPNPDOC
Text Note


Date of Service


The patient was seen on 12/17/17.





NOTE


Subjective: Dyspnea continues to improve.  Denies CP/palpitations.  





Objective: 


Vitals: (see below) 


General: No acute distress, laying comfortably in bed.


HEENT: Moist mucous membranes.


Neck: No JVD or lymphadenopathy


Cardiac: RRR, No murmurs


Pulm: Coarse crackles at the bases b/l. No wheezing. No stridor. No use of 

accessory muscles. 


Abd: NT/ND + BS


Ext: 1+ edema right lower extremity greater than the left. No cyanosis. Distal 

pulses intact.





Labs (see below) 





Images:





Assessment/Plan


1. Sepsis 2/2 PNA, with ? UTI ( Foul smelling urine with + UA) - CXR (see above)

. Started on vanco/Levaquin. CRP/WBC improving. Blood/sputum/urine cx sent. 


2. Diastolic heart failure improved. ? Asthma, with questionable component of 

restrictive lung disease given history of rheumatoid arthritis. Patient is 

being diuresed on torsemide, dose decreased. Fluid restriction, and daily 

weights. Also on prednisone. Continue nebs. Incentive spirometer. CTA chest on 

12/6 negative for PE.


3. Nonocclusive DVT in left lower extremity- Dr. Dumont had spoken to the family 

regarding the risks and benefits of anticoagulation and they have opted for  

anticoagulation. She is currently on Eliquis. 


4. Status post hematuria on 12/9. Resolved. Will need outpatient follow-up with 

urology.


5. Generalized weakness secondary to deconditioning- patient encouraged to 

participate with physical therapy


6. Atrial fibrillation- rate is currently controlled. Also on Eliquis. 


7. Parkinson's dz - cont home meds


8. H/o PE s/o coumadin.


9. H/o GI bleed while on coumadin. Hb stable. No bleeding at this time.


10. HLD on statin


11. Hypothyroidism - on synthroid


12. Polymyalgia/RA - cont prednisone. 


13. H/o depression - cont home meds


14. GERD - on PPT





DVT prophy: On Eliquis





Will likely need subacute rehab.





VS,Fishbone, I+O


VS, Fishbone, I+O


Laboratory Tests


12/17/17 05:20








Red Blood Count 3.09 L, Mean Corpuscular Volume 106.5 H, Mean Corpuscular 

Hemoglobin 35.3 H, Mean Corpuscular Hemoglobin Concent 33.1, Red Cell 

Distribution Width 15.1 H, Calcium Level 8.2 L








Vital Signs








  Date Time  Temp Pulse Resp B/P (MAP) Pulse Ox O2 Delivery O2 Flow Rate FiO2


 


12/17/17 09:57  74  130/61    


 


12/17/17 06:00 97.7  16  92 Nasal Cannula 3.0 














I&O- Last 24 Hours up to 6 AM 


 


 12/18/17





 06:00


 


Intake Total 120 ml


 


Balance 120 ml

















ARPAN BARBOSA MD Dec 17, 2017 11:30

## 2017-12-18 VITALS — SYSTOLIC BLOOD PRESSURE: 135 MMHG | DIASTOLIC BLOOD PRESSURE: 58 MMHG

## 2017-12-18 VITALS — SYSTOLIC BLOOD PRESSURE: 120 MMHG | DIASTOLIC BLOOD PRESSURE: 57 MMHG

## 2017-12-18 VITALS — SYSTOLIC BLOOD PRESSURE: 113 MMHG | DIASTOLIC BLOOD PRESSURE: 64 MMHG

## 2017-12-18 LAB
ANION GAP SERPL CALC-SCNC: 8 MEQ/L (ref 8–16)
BUN SERPL-MCNC: 24 MG/DL (ref 7–18)
CALCIUM SERPL-MCNC: 7.9 MG/DL (ref 8.8–10.2)
CHLORIDE SERPL-SCNC: 99 MEQ/L (ref 98–107)
CO2 SERPL-SCNC: 30 MEQ/L (ref 21–32)
CREAT SERPL-MCNC: 0.74 MG/DL (ref 0.55–1.02)
ERYTHROCYTE [DISTWIDTH] IN BLOOD BY AUTOMATED COUNT: 15.5 % (ref 11.5–14.5)
GFR SERPL CREATININE-BSD FRML MDRD: > 60 ML/MIN/{1.73_M2} (ref 32–?)
GLUCOSE SERPL-MCNC: 159 MG/DL (ref 83–110)
MCH RBC QN AUTO: 34.8 PG (ref 27–33)
MCHC RBC AUTO-ENTMCNC: 33 G/DL (ref 32–36.5)
MCV RBC AUTO: 105.5 FL (ref 80–96)
NRBC BLD AUTO-RTO: 0.2 % (ref 0–0)
PLATELET # BLD AUTO: 350 10^3/UL (ref 150–450)
POTASSIUM SERPL-SCNC: 4.7 MEQ/L (ref 3.5–5.1)
SODIUM SERPL-SCNC: 137 MEQ/L (ref 136–145)
WBC # BLD AUTO: 12.8 10^3/UL (ref 4–10)

## 2017-12-18 RX ADMIN — POTASSIUM CHLORIDE SCH MEQ: 750 TABLET, EXTENDED RELEASE ORAL at 08:19

## 2017-12-18 RX ADMIN — FOLIC ACID SCH MG: 1 TABLET ORAL at 21:50

## 2017-12-18 RX ADMIN — OMEPRAZOLE SCH MG: 20 CAPSULE, DELAYED RELEASE ORAL at 21:50

## 2017-12-18 RX ADMIN — DIVALPROEX SODIUM SCH MG: 500 TABLET, FILM COATED, EXTENDED RELEASE ORAL at 21:51

## 2017-12-18 RX ADMIN — DOCUSATE SODIUM SCH MG: 100 CAPSULE, LIQUID FILLED ORAL at 21:50

## 2017-12-18 RX ADMIN — CARBIDOPA AND LEVODOPA SCH TAB: 25; 100 TABLET ORAL at 17:24

## 2017-12-18 RX ADMIN — METOPROLOL SUCCINATE SCH MG: 25 TABLET, EXTENDED RELEASE ORAL at 21:51

## 2017-12-18 RX ADMIN — DIVALPROEX SODIUM SCH MG: 500 TABLET, FILM COATED, EXTENDED RELEASE ORAL at 08:17

## 2017-12-18 RX ADMIN — LEVOTHYROXINE SODIUM SCH MCG: 150 TABLET ORAL at 05:28

## 2017-12-18 RX ADMIN — PRIMIDONE SCH MG: 50 TABLET ORAL at 21:52

## 2017-12-18 RX ADMIN — POLYETHYLENE GLYCOL 3350 SCH PKT: 17 POWDER, FOR SOLUTION ORAL at 21:52

## 2017-12-18 RX ADMIN — DOCUSATE SODIUM SCH MG: 100 CAPSULE, LIQUID FILLED ORAL at 08:20

## 2017-12-18 RX ADMIN — CARBIDOPA AND LEVODOPA SCH TAB: 25; 100 TABLET ORAL at 21:50

## 2017-12-18 RX ADMIN — TORSEMIDE SCH MG: 20 TABLET ORAL at 17:25

## 2017-12-18 RX ADMIN — DEXTROSE MONOHYDRATE SCH MLS/HR: 50 INJECTION, SOLUTION INTRAVENOUS at 04:47

## 2017-12-18 RX ADMIN — LORATADINE SCH MG: 10 TABLET ORAL at 08:18

## 2017-12-18 RX ADMIN — OXYBUTYNIN CHLORIDE SCH MG: 5 TABLET, EXTENDED RELEASE ORAL at 08:17

## 2017-12-18 RX ADMIN — ATORVASTATIN CALCIUM SCH MG: 10 TABLET, FILM COATED ORAL at 21:51

## 2017-12-18 RX ADMIN — APIXABAN SCH MG: 2.5 TABLET, FILM COATED ORAL at 08:17

## 2017-12-18 RX ADMIN — CARBIDOPA AND LEVODOPA SCH TAB: 25; 100 TABLET ORAL at 12:44

## 2017-12-18 RX ADMIN — ASPIRIN SCH MG: 81 TABLET ORAL at 08:17

## 2017-12-18 RX ADMIN — FERROUS SULFATE TAB 325 MG (65 MG ELEMENTAL FE) SCH MG: 325 (65 FE) TAB at 08:18

## 2017-12-18 RX ADMIN — OMEPRAZOLE SCH MG: 20 CAPSULE, DELAYED RELEASE ORAL at 08:18

## 2017-12-18 RX ADMIN — PRIMIDONE SCH MG: 50 TABLET ORAL at 08:18

## 2017-12-18 RX ADMIN — CARBIDOPA AND LEVODOPA SCH TAB: 25; 100 TABLET ORAL at 08:19

## 2017-12-18 RX ADMIN — POTASSIUM CHLORIDE SCH MEQ: 750 TABLET, EXTENDED RELEASE ORAL at 21:52

## 2017-12-18 RX ADMIN — MULTIPLE VITAMINS W/ MINERALS TAB SCH TAB: TAB at 08:17

## 2017-12-18 RX ADMIN — APIXABAN SCH MG: 2.5 TABLET, FILM COATED ORAL at 21:51

## 2017-12-18 RX ADMIN — METOPROLOL SUCCINATE SCH MG: 25 TABLET, EXTENDED RELEASE ORAL at 08:20

## 2017-12-18 RX ADMIN — DOCUSATE SODIUM SCH MG: 100 CAPSULE, LIQUID FILLED ORAL at 08:19

## 2017-12-18 RX ADMIN — POLYETHYLENE GLYCOL 3350 SCH PKT: 17 POWDER, FOR SOLUTION ORAL at 08:20

## 2017-12-18 RX ADMIN — TORSEMIDE SCH MG: 20 TABLET ORAL at 08:18

## 2017-12-19 VITALS — DIASTOLIC BLOOD PRESSURE: 56 MMHG | SYSTOLIC BLOOD PRESSURE: 121 MMHG

## 2017-12-19 VITALS — DIASTOLIC BLOOD PRESSURE: 60 MMHG | SYSTOLIC BLOOD PRESSURE: 128 MMHG

## 2017-12-19 VITALS — SYSTOLIC BLOOD PRESSURE: 126 MMHG | DIASTOLIC BLOOD PRESSURE: 60 MMHG

## 2017-12-19 LAB
ANION GAP SERPL CALC-SCNC: 7 MEQ/L (ref 8–16)
BUN SERPL-MCNC: 21 MG/DL (ref 7–18)
CALCIUM SERPL-MCNC: 8.1 MG/DL (ref 8.8–10.2)
CHLORIDE SERPL-SCNC: 102 MEQ/L (ref 98–107)
CO2 SERPL-SCNC: 31 MEQ/L (ref 21–32)
CREAT SERPL-MCNC: 0.69 MG/DL (ref 0.55–1.02)
ERYTHROCYTE [DISTWIDTH] IN BLOOD BY AUTOMATED COUNT: 15.9 % (ref 11.5–14.5)
GFR SERPL CREATININE-BSD FRML MDRD: > 60 ML/MIN/{1.73_M2} (ref 32–?)
GLUCOSE SERPL-MCNC: 111 MG/DL (ref 83–110)
MCH RBC QN AUTO: 35.5 PG (ref 27–33)
MCHC RBC AUTO-ENTMCNC: 33.2 G/DL (ref 32–36.5)
MCV RBC AUTO: 106.8 FL (ref 80–96)
NRBC BLD AUTO-RTO: 0.3 % (ref 0–0)
PLATELET # BLD AUTO: 334 10^3/UL (ref 150–450)
POTASSIUM SERPL-SCNC: 4.7 MEQ/L (ref 3.5–5.1)
SODIUM SERPL-SCNC: 140 MEQ/L (ref 136–145)
WBC # BLD AUTO: 11.6 10^3/UL (ref 4–10)

## 2017-12-19 RX ADMIN — DOCUSATE SODIUM SCH MG: 100 CAPSULE, LIQUID FILLED ORAL at 07:52

## 2017-12-19 RX ADMIN — OMEPRAZOLE SCH MG: 20 CAPSULE, DELAYED RELEASE ORAL at 09:19

## 2017-12-19 RX ADMIN — MULTIPLE VITAMINS W/ MINERALS TAB SCH TAB: TAB at 09:19

## 2017-12-19 RX ADMIN — DIVALPROEX SODIUM SCH MG: 500 TABLET, FILM COATED, EXTENDED RELEASE ORAL at 22:03

## 2017-12-19 RX ADMIN — LORATADINE SCH MG: 10 TABLET ORAL at 09:20

## 2017-12-19 RX ADMIN — CARBIDOPA AND LEVODOPA SCH TAB: 25; 100 TABLET ORAL at 22:02

## 2017-12-19 RX ADMIN — POLYETHYLENE GLYCOL 3350 SCH PKT: 17 POWDER, FOR SOLUTION ORAL at 21:00

## 2017-12-19 RX ADMIN — DIVALPROEX SODIUM SCH MG: 500 TABLET, FILM COATED, EXTENDED RELEASE ORAL at 09:20

## 2017-12-19 RX ADMIN — TORSEMIDE SCH MG: 20 TABLET ORAL at 16:19

## 2017-12-19 RX ADMIN — ATORVASTATIN CALCIUM SCH MG: 10 TABLET, FILM COATED ORAL at 22:01

## 2017-12-19 RX ADMIN — FOLIC ACID SCH MG: 1 TABLET ORAL at 22:03

## 2017-12-19 RX ADMIN — POTASSIUM CHLORIDE SCH MEQ: 750 TABLET, EXTENDED RELEASE ORAL at 22:03

## 2017-12-19 RX ADMIN — OXYBUTYNIN CHLORIDE SCH MG: 5 TABLET, EXTENDED RELEASE ORAL at 09:19

## 2017-12-19 RX ADMIN — CARBIDOPA AND LEVODOPA SCH TAB: 25; 100 TABLET ORAL at 09:18

## 2017-12-19 RX ADMIN — CARBIDOPA AND LEVODOPA SCH TAB: 25; 100 TABLET ORAL at 13:20

## 2017-12-19 RX ADMIN — FERROUS SULFATE TAB 325 MG (65 MG ELEMENTAL FE) SCH MG: 325 (65 FE) TAB at 09:19

## 2017-12-19 RX ADMIN — TORSEMIDE SCH MG: 20 TABLET ORAL at 09:21

## 2017-12-19 RX ADMIN — METOPROLOL SUCCINATE SCH MG: 25 TABLET, EXTENDED RELEASE ORAL at 22:01

## 2017-12-19 RX ADMIN — DOCUSATE SODIUM SCH MG: 100 CAPSULE, LIQUID FILLED ORAL at 22:03

## 2017-12-19 RX ADMIN — PRIMIDONE SCH MG: 50 TABLET ORAL at 09:19

## 2017-12-19 RX ADMIN — METOPROLOL SUCCINATE SCH MG: 25 TABLET, EXTENDED RELEASE ORAL at 09:20

## 2017-12-19 RX ADMIN — APIXABAN SCH MG: 2.5 TABLET, FILM COATED ORAL at 22:04

## 2017-12-19 RX ADMIN — POLYETHYLENE GLYCOL 3350 SCH PKT: 17 POWDER, FOR SOLUTION ORAL at 07:52

## 2017-12-19 RX ADMIN — PRIMIDONE SCH MG: 50 TABLET ORAL at 22:02

## 2017-12-19 RX ADMIN — ASPIRIN SCH MG: 81 TABLET ORAL at 09:20

## 2017-12-19 RX ADMIN — CARBIDOPA AND LEVODOPA SCH TAB: 25; 100 TABLET ORAL at 16:19

## 2017-12-19 RX ADMIN — OMEPRAZOLE SCH MG: 20 CAPSULE, DELAYED RELEASE ORAL at 22:03

## 2017-12-19 RX ADMIN — POTASSIUM CHLORIDE SCH MEQ: 750 TABLET, EXTENDED RELEASE ORAL at 09:19

## 2017-12-19 RX ADMIN — LEVOTHYROXINE SODIUM SCH MCG: 150 TABLET ORAL at 05:45

## 2017-12-19 RX ADMIN — APIXABAN SCH MG: 2.5 TABLET, FILM COATED ORAL at 09:21

## 2017-12-19 NOTE — IPNPDOC
Text Note


Date of Service


The patient was seen on 12/19/17.





NOTE


Subjective:


Patient is an 87 year old  female with a PMHx of A. fib, Diastolic CHF

, MV & AV Regurgitation, Ventricular ectopy, Parkinson's, PE (previously on 

Coumadin, with bleeding complication), DLP, Hypothyroidism, Polymyalgia, RA, 

who presented to the ER with SOB and non-productive cough for 10 days. She was 

found down at home and brought into the ER. 





She was found to have a non-occlusive DVT and a negative workup for PE. She has 

been started on Eliquis. She appeared to have signs of fluid overload and 

started on IV diuretics. 





Patient was seen and examined at the bedside. She has noted improved in her 

breathing. Has been titrated down on oxygen this morning. Will c/w Physical 

therapy.





Objective:


Vitals (See below)


General: Lying in bed, no acute distress, comfortable, AAOx3


HEENT: NC, AT


CVS: RRR, +S1S2


Lungs: Fair air entry b/l, -w/r/r


Abdomen: Soft, ND, NT


Extremities: Diffuse edema - non-pitting, - Calf tenderness





Assessment and plan:


Sepsis - 2/2 PNA, possibly UTI


- Patient had SOB, with mild productive cough


- Mild leukocytosis; remains afebrile


- CRP adn WBC improving


- c/w Levaquin and Vancomycin (Antibiotic day #8)


- c/w Supplemental oxygen - will try and continue to wean off as oxygenation 

improves 





Dyspnea - possibly 2/2 fluid overload - likely 2/2 decompensated diastolic CHF, 

possible asthma 


- proBNP elevated from admission


- CTA Chest 12/6: no PE, dependent atelectasis noted


- c/w Torsemide; dose at 20 BID


- c/w Prednisone at 40 BID; will reduce to 20 BID


- c/w Xopinex inhaled therapy 


- c/w incentive spirometry and acapella 





Non-occlusive Left LE DVT


- Risks and benefits of anticoagulation were discussed with family


- c/w Eliquis for minimum of 3 months


- Dr. Parra (PCP) will continue to manage as an outpatient 





s/p Hematuria


- Reported to have hematuria on 12/9 AM


- Urine still appears yellow; without gross blood


- Urinalysis with 2+ blood


- c/w Domenicais 





s/p Hypokalemia





Weakness - likely 2/2 deconditioning


- c/w Physical therapy 





s/p Lactic acidosis


- No evidence of infection





A. fib with episode of RVR


- currently rate controlled


- off telemetry


- c/w metoprolol succinate for rate control


- on full anticoagulation with Eliquis





Parkinson's


- c/w Carbidopa / Levodopa 


- c/w Primidone 





Hx of PE


- previously on Coumadin, with bleeding complication (rectal bleeding) 





DLP


- c/w Atorvastatin 





Hypothyroidism


- c/w Levothyroxine 





Polymyalgia / RA


- c/w Prednisone (will need to return back to baseline dose when breathing has 

improved)





Depression


- c/w Paroxetine 





GERD


- c/w Omeprazole





DVT prophylaxis


- on full anticoagulation with Eliquis





Disposition:


- c/w Antibiotics; awaiting for oxygenation to improve





VS,Мария, I+O


VSМария, I+O


Laboratory Tests


12/19/17 05:46








Red Blood Count 3.07 L, Mean Corpuscular Volume 106.8 H, Mean Corpuscular 

Hemoglobin 35.5 H, Mean Corpuscular Hemoglobin Concent 33.2, Red Cell 

Distribution Width 15.9 H, Calcium Level 8.1 L








Vital Signs








  Date Time  Temp Pulse Resp B/P (MAP) Pulse Ox O2 Delivery O2 Flow Rate FiO2


 


12/19/17 14:00 98.4 79 20 121/56 (77) 91 Nasal Cannula 1.0 














I&O- Last 24 Hours up to 6 AM 


 


 12/20/17





 06:00


 


Intake Total 720 ml


 


Balance 720 ml

















SHAHRZAD POTTER MD Dec 19, 2017 15:44

## 2017-12-20 VITALS — DIASTOLIC BLOOD PRESSURE: 58 MMHG | SYSTOLIC BLOOD PRESSURE: 132 MMHG

## 2017-12-20 VITALS — DIASTOLIC BLOOD PRESSURE: 67 MMHG | SYSTOLIC BLOOD PRESSURE: 138 MMHG

## 2017-12-20 VITALS — SYSTOLIC BLOOD PRESSURE: 121 MMHG | DIASTOLIC BLOOD PRESSURE: 57 MMHG

## 2017-12-20 LAB
ANION GAP SERPL CALC-SCNC: 6 MEQ/L (ref 8–16)
BASOPHILS # BLD AUTO: 0.1 10^3/UL (ref 0–0.2)
BASOPHILS NFR BLD AUTO: 0.6 % (ref 0–1)
BUN SERPL-MCNC: 22 MG/DL (ref 7–18)
CALCIUM SERPL-MCNC: 8.5 MG/DL (ref 8.8–10.2)
CHLORIDE SERPL-SCNC: 100 MEQ/L (ref 98–107)
CO2 SERPL-SCNC: 35 MEQ/L (ref 21–32)
CREAT SERPL-MCNC: 0.79 MG/DL (ref 0.55–1.02)
DIFF SLIDE NUMBER: 11
EOSINOPHIL # BLD AUTO: 0.1 10^3/UL (ref 0–0.5)
EOSINOPHIL NFR BLD AUTO: 0.4 % (ref 0–3)
ERYTHROCYTE [DISTWIDTH] IN BLOOD BY AUTOMATED COUNT: 16.1 % (ref 11.5–14.5)
GFR SERPL CREATININE-BSD FRML MDRD: > 60 ML/MIN/{1.73_M2} (ref 32–?)
GLUCOSE SERPL-MCNC: 96 MG/DL (ref 83–110)
IMM GRANULOCYTES NFR BLD: 4.2 % (ref 0–0)
LYMPHOCYTES # BLD AUTO: 1.9 10^3/UL (ref 1.5–4.5)
LYMPHOCYTES NFR BLD AUTO: 13.1 % (ref 24–44)
MCH RBC QN AUTO: 35.4 PG (ref 27–33)
MCHC RBC AUTO-ENTMCNC: 33.3 G/DL (ref 32–36.5)
MCV RBC AUTO: 106.3 FL (ref 80–96)
MONOCYTES # BLD AUTO: 1.1 10^3/UL (ref 0–0.8)
MONOCYTES NFR BLD AUTO: 7.6 % (ref 0–5)
NEUTROPHILS # BLD AUTO: 11 10^3/UL (ref 1.8–7.7)
NEUTROPHILS NFR BLD AUTO: 74.1 % (ref 36–66)
NRBC BLD AUTO-RTO: 0.3 % (ref 0–0)
PLATELET # BLD AUTO: 377 10^3/UL (ref 150–450)
POTASSIUM SERPL-SCNC: 4.3 MEQ/L (ref 3.5–5.1)
SODIUM SERPL-SCNC: 141 MEQ/L (ref 136–145)
WBC # BLD AUTO: 14.9 10^3/UL (ref 4–10)

## 2017-12-20 RX ADMIN — APIXABAN SCH MG: 2.5 TABLET, FILM COATED ORAL at 10:38

## 2017-12-20 RX ADMIN — METOPROLOL SUCCINATE SCH MG: 25 TABLET, EXTENDED RELEASE ORAL at 10:35

## 2017-12-20 RX ADMIN — CARBIDOPA AND LEVODOPA SCH TAB: 25; 100 TABLET ORAL at 10:37

## 2017-12-20 RX ADMIN — DIVALPROEX SODIUM SCH MG: 500 TABLET, FILM COATED, EXTENDED RELEASE ORAL at 10:37

## 2017-12-20 RX ADMIN — CARBIDOPA AND LEVODOPA SCH TAB: 25; 100 TABLET ORAL at 13:17

## 2017-12-20 RX ADMIN — LORATADINE SCH MG: 10 TABLET ORAL at 10:39

## 2017-12-20 RX ADMIN — OXYBUTYNIN CHLORIDE SCH MG: 5 TABLET, EXTENDED RELEASE ORAL at 10:39

## 2017-12-20 RX ADMIN — OMEPRAZOLE SCH MG: 20 CAPSULE, DELAYED RELEASE ORAL at 21:29

## 2017-12-20 RX ADMIN — FERROUS SULFATE TAB 325 MG (65 MG ELEMENTAL FE) SCH MG: 325 (65 FE) TAB at 10:39

## 2017-12-20 RX ADMIN — TORSEMIDE SCH MG: 20 TABLET ORAL at 17:31

## 2017-12-20 RX ADMIN — ATORVASTATIN CALCIUM SCH MG: 10 TABLET, FILM COATED ORAL at 21:34

## 2017-12-20 RX ADMIN — TORSEMIDE SCH MG: 20 TABLET ORAL at 10:39

## 2017-12-20 RX ADMIN — CARBIDOPA AND LEVODOPA SCH TAB: 25; 100 TABLET ORAL at 21:30

## 2017-12-20 RX ADMIN — PRIMIDONE SCH MG: 50 TABLET ORAL at 10:35

## 2017-12-20 RX ADMIN — DOCUSATE SODIUM SCH MG: 100 CAPSULE, LIQUID FILLED ORAL at 10:38

## 2017-12-20 RX ADMIN — APIXABAN SCH MG: 2.5 TABLET, FILM COATED ORAL at 21:29

## 2017-12-20 RX ADMIN — METOPROLOL SUCCINATE SCH MG: 25 TABLET, EXTENDED RELEASE ORAL at 21:34

## 2017-12-20 RX ADMIN — FOLIC ACID SCH MG: 1 TABLET ORAL at 21:32

## 2017-12-20 RX ADMIN — POTASSIUM CHLORIDE SCH MEQ: 750 TABLET, EXTENDED RELEASE ORAL at 10:39

## 2017-12-20 RX ADMIN — MULTIPLE VITAMINS W/ MINERALS TAB SCH TAB: TAB at 10:38

## 2017-12-20 RX ADMIN — ASPIRIN SCH MG: 81 TABLET ORAL at 10:38

## 2017-12-20 RX ADMIN — METHOTREXATE SODIUM SCH MG: 2.5 TABLET ORAL at 10:36

## 2017-12-20 RX ADMIN — POLYETHYLENE GLYCOL 3350 SCH PKT: 17 POWDER, FOR SOLUTION ORAL at 21:29

## 2017-12-20 RX ADMIN — POLYETHYLENE GLYCOL 3350 SCH PKT: 17 POWDER, FOR SOLUTION ORAL at 09:00

## 2017-12-20 RX ADMIN — POTASSIUM CHLORIDE SCH MEQ: 750 TABLET, EXTENDED RELEASE ORAL at 21:32

## 2017-12-20 RX ADMIN — CARBIDOPA AND LEVODOPA SCH TAB: 25; 100 TABLET ORAL at 17:31

## 2017-12-20 RX ADMIN — OMEPRAZOLE SCH MG: 20 CAPSULE, DELAYED RELEASE ORAL at 10:38

## 2017-12-20 RX ADMIN — LEVOTHYROXINE SODIUM SCH MCG: 150 TABLET ORAL at 06:03

## 2017-12-20 RX ADMIN — PRIMIDONE SCH MG: 50 TABLET ORAL at 21:32

## 2017-12-20 RX ADMIN — DOCUSATE SODIUM SCH MG: 100 CAPSULE, LIQUID FILLED ORAL at 21:29

## 2017-12-20 RX ADMIN — DIVALPROEX SODIUM SCH MG: 500 TABLET, FILM COATED, EXTENDED RELEASE ORAL at 21:34

## 2017-12-20 NOTE — IPNPDOC
Text Note


Date of Service


The patient was seen on 12/20/17.





NOTE


Subjective:


Patient is an 87 year old  female with a PMHx of A. fib, Diastolic CHF

, MV & AV Regurgitation, Ventricular ectopy, Parkinson's, PE (previously on 

Coumadin, with bleeding complication), DLP, Hypothyroidism, Polymyalgia, RA, 

who presented to the ER with SOB and non-productive cough for 10 days. She was 

found down at home and brought into the ER. 





She was found to have a non-occlusive DVT and a negative workup for PE. She has 

been started on Eliquis. She appeared to have signs of fluid overload and 

started on IV diuretics. 





Patient was seen and examined at the bedside. She is seen sitting up in chair. 

She denies any events overnight. She notes her breathing is doing better and 

her cough has resolved. 





Objective:


Vitals (See below)


General: Lying in bed, no acute distress, comfortable, AAOx3


HEENT: NC, AT


CVS: RRR, +S1S2


Lungs: Fair air entry b/l, -w/r/r


Abdomen: Soft, ND, NT


Extremities: Diffuse edema - non-pitting, - Calf tenderness





Assessment and plan:


Sepsis - 2/2 PNA, possibly UTI


- Reports improvement in her breathing, denies cough 


- Mild leukocytosis; remains afebrile


- CRP improving; WBC mildly elevated this morning 


- c/w Levaquin and Vancomycin (Antibiotic day #9)


- Has been titrated down to 1 liter of nasal cannula oxygen 





Dyspnea - possibly 2/2 fluid overload - likely 2/2 decompensated diastolic CHF, 

possible asthma 


- proBNP elevated from admission


- CTA Chest 12/6: no PE, dependent atelectasis noted


- c/w Torsemide; dose at 20 BID


- c/w Prednisone 20 BID; will reduced to 20 QD tomorrow 


- c/w Xopinex inhaled therapy 


- c/w incentive spirometry and acapella 





Non-occlusive Left LE DVT


- Risks and benefits of anticoagulation were discussed with family


- c/w Eliquis for minimum of 3 months


- Dr. Parra (PCP) will continue to manage as an outpatient 





s/p Hematuria


- Reported to have hematuria on 12/9 AM


- Urine still appears yellow; without gross blood


- Urinalysis with 2+ blood


- c/w Jori 





s/p Hypokalemia





Weakness - likely 2/2 deconditioning


- c/w Physical therapy; will likely need sub-acute rehab 





s/p Lactic acidosis


- No evidence of infection





A. fib with episode of RVR


- currently rate controlled


- off telemetry


- c/w metoprolol succinate for rate control


- on full anticoagulation with Eliquis





Parkinson's


- c/w Carbidopa / Levodopa 


- c/w Primidone 





Hx of PE


- previously on Coumadin, with bleeding complication (rectal bleeding) 





DLP


- c/w Atorvastatin 





Hypothyroidism


- c/w Levothyroxine 





Polymyalgia / RA


- c/w Prednisone (will need to return back to baseline dose when breathing has 

improved)





Depression


- c/w Paroxetine 





GERD


- c/w Omeprazole





DVT prophylaxis


- on full anticoagulation with Eliquis





Disposition:


- c/w Antibiotics; awaiting for oxygenation to improve





VS,Fishbone, I+O


VS, Fishbone, I+O


Laboratory Tests


12/20/17 06:03








Red Blood Count 3.33 L, Mean Corpuscular Volume 106.3 H, Mean Corpuscular 

Hemoglobin 35.4 H, Mean Corpuscular Hemoglobin Concent 33.3, Red Cell 

Distribution Width 16.1 H, Calcium Level 8.5 L








Vital Signs








  Date Time  Temp Pulse Resp B/P (MAP) Pulse Ox O2 Delivery O2 Flow Rate FiO2


 


12/20/17 10:35  78  128/60    


 


12/20/17 06:00 97.3  19  94 Nasal Cannula 1.0 














I&O- Last 24 Hours up to 6 AM 


 


 12/20/17





 06:00


 


Intake Total 1140 ml


 


Output Total 1100 ml


 


Balance 40 ml

















SHAHRZAD POTTER MD Dec 20, 2017 12:36

## 2017-12-21 VITALS — DIASTOLIC BLOOD PRESSURE: 76 MMHG | SYSTOLIC BLOOD PRESSURE: 151 MMHG

## 2017-12-21 VITALS — SYSTOLIC BLOOD PRESSURE: 117 MMHG | DIASTOLIC BLOOD PRESSURE: 58 MMHG

## 2017-12-21 VITALS — SYSTOLIC BLOOD PRESSURE: 125 MMHG | DIASTOLIC BLOOD PRESSURE: 60 MMHG

## 2017-12-21 LAB
ANION GAP SERPL CALC-SCNC: 7 MEQ/L (ref 8–16)
BUN SERPL-MCNC: 23 MG/DL (ref 7–18)
CALCIUM SERPL-MCNC: 8 MG/DL (ref 8.8–10.2)
CHLORIDE SERPL-SCNC: 100 MEQ/L (ref 98–107)
CO2 SERPL-SCNC: 32 MEQ/L (ref 21–32)
CREAT SERPL-MCNC: 0.74 MG/DL (ref 0.55–1.02)
ERYTHROCYTE [DISTWIDTH] IN BLOOD BY AUTOMATED COUNT: 16.2 % (ref 11.5–14.5)
GFR SERPL CREATININE-BSD FRML MDRD: > 60 ML/MIN/{1.73_M2} (ref 32–?)
GLUCOSE SERPL-MCNC: 89 MG/DL (ref 83–110)
MCH RBC QN AUTO: 35.4 PG (ref 27–33)
MCHC RBC AUTO-ENTMCNC: 32.8 G/DL (ref 32–36.5)
MCV RBC AUTO: 107.9 FL (ref 80–96)
NRBC BLD AUTO-RTO: 0.3 % (ref 0–0)
PLATELET # BLD AUTO: 357 10^3/UL (ref 150–450)
POTASSIUM SERPL-SCNC: 4.5 MEQ/L (ref 3.5–5.1)
SODIUM SERPL-SCNC: 139 MEQ/L (ref 136–145)
WBC # BLD AUTO: 11.4 10^3/UL (ref 4–10)

## 2017-12-21 RX ADMIN — CARBIDOPA AND LEVODOPA SCH TAB: 25; 100 TABLET ORAL at 09:22

## 2017-12-21 RX ADMIN — CARBIDOPA AND LEVODOPA SCH TAB: 25; 100 TABLET ORAL at 13:09

## 2017-12-21 RX ADMIN — APIXABAN SCH MG: 2.5 TABLET, FILM COATED ORAL at 09:21

## 2017-12-21 RX ADMIN — DIVALPROEX SODIUM SCH MG: 500 TABLET, FILM COATED, EXTENDED RELEASE ORAL at 22:39

## 2017-12-21 RX ADMIN — PRIMIDONE SCH MG: 50 TABLET ORAL at 22:38

## 2017-12-21 RX ADMIN — OMEPRAZOLE SCH MG: 20 CAPSULE, DELAYED RELEASE ORAL at 09:20

## 2017-12-21 RX ADMIN — POLYETHYLENE GLYCOL 3350 SCH PKT: 17 POWDER, FOR SOLUTION ORAL at 09:00

## 2017-12-21 RX ADMIN — APIXABAN SCH MG: 2.5 TABLET, FILM COATED ORAL at 22:40

## 2017-12-21 RX ADMIN — MULTIPLE VITAMINS W/ MINERALS TAB SCH TAB: TAB at 09:20

## 2017-12-21 RX ADMIN — POLYETHYLENE GLYCOL 3350 SCH PKT: 17 POWDER, FOR SOLUTION ORAL at 21:00

## 2017-12-21 RX ADMIN — LORATADINE SCH MG: 10 TABLET ORAL at 09:20

## 2017-12-21 RX ADMIN — POTASSIUM CHLORIDE SCH MEQ: 750 TABLET, EXTENDED RELEASE ORAL at 09:20

## 2017-12-21 RX ADMIN — TORSEMIDE SCH MG: 20 TABLET ORAL at 17:55

## 2017-12-21 RX ADMIN — FOLIC ACID SCH MG: 1 TABLET ORAL at 22:37

## 2017-12-21 RX ADMIN — CARBIDOPA AND LEVODOPA SCH TAB: 25; 100 TABLET ORAL at 22:39

## 2017-12-21 RX ADMIN — PRIMIDONE SCH MG: 50 TABLET ORAL at 09:22

## 2017-12-21 RX ADMIN — ASPIRIN SCH MG: 81 TABLET ORAL at 09:21

## 2017-12-21 RX ADMIN — OXYBUTYNIN CHLORIDE SCH MG: 5 TABLET, EXTENDED RELEASE ORAL at 09:21

## 2017-12-21 RX ADMIN — TORSEMIDE SCH MG: 20 TABLET ORAL at 09:24

## 2017-12-21 RX ADMIN — ATORVASTATIN CALCIUM SCH MG: 10 TABLET, FILM COATED ORAL at 22:40

## 2017-12-21 RX ADMIN — DOCUSATE SODIUM SCH MG: 100 CAPSULE, LIQUID FILLED ORAL at 09:20

## 2017-12-21 RX ADMIN — LEVOTHYROXINE SODIUM SCH MCG: 150 TABLET ORAL at 05:46

## 2017-12-21 RX ADMIN — DIVALPROEX SODIUM SCH MG: 500 TABLET, FILM COATED, EXTENDED RELEASE ORAL at 09:20

## 2017-12-21 RX ADMIN — CARBIDOPA AND LEVODOPA SCH TAB: 25; 100 TABLET ORAL at 17:55

## 2017-12-21 RX ADMIN — METOPROLOL SUCCINATE SCH MG: 25 TABLET, EXTENDED RELEASE ORAL at 22:39

## 2017-12-21 RX ADMIN — FERROUS SULFATE TAB 325 MG (65 MG ELEMENTAL FE) SCH MG: 325 (65 FE) TAB at 09:21

## 2017-12-21 RX ADMIN — DOCUSATE SODIUM SCH MG: 100 CAPSULE, LIQUID FILLED ORAL at 22:39

## 2017-12-21 RX ADMIN — METOPROLOL SUCCINATE SCH MG: 25 TABLET, EXTENDED RELEASE ORAL at 09:22

## 2017-12-21 RX ADMIN — POTASSIUM CHLORIDE SCH MEQ: 750 TABLET, EXTENDED RELEASE ORAL at 22:37

## 2017-12-21 RX ADMIN — OMEPRAZOLE SCH MG: 20 CAPSULE, DELAYED RELEASE ORAL at 22:39

## 2017-12-21 NOTE — IPNPDOC
Text Note


Date of Service


The patient was seen on 12/21/17.





NOTE


Subjective:


Patient is an 87 year old  female with a PMHx of A. fib, Diastolic CHF

, MV & AV Regurgitation, Ventricular ectopy, Parkinson's, PE (previously on 

Coumadin, with bleeding complication), DLP, Hypothyroidism, Polymyalgia, RA, 

who presented to the ER with SOB and non-productive cough for 10 days. She was 

found down at home and brought into the ER. 





She was found to have a non-occlusive DVT and a negative workup for PE. She has 

been started on Eliquis. She appeared to have signs of fluid overload and 

started on IV diuretics. 





Patient was seen and examined at the bedside. She notes that she feels weaker. 

Her breathing is doing better and her cough has resolved. She will continue to 

work with physical therapy; plan for placement at subacute rehab. 





Objective:


Vitals (See below)


General: Lying in bed, no acute distress, comfortable, AAOx3


HEENT: NC, AT


CVS: RRR, +S1S2


Lungs: Fair air entry b/l, -w/r/r


Abdomen: Soft, ND, NT


Extremities: Diffuse edema - non-pitting, - Calf tenderness





Assessment and plan:


Sepsis - 2/2 PNA, possibly UTI


- Reports improvement in her breathing, denies cough 


- Mild leukocytosis; remains afebrile


- CRP improving; WBC mildly elevated this morning 


- c/w Levaquin; s/p Vancomycin (Antibiotic day #10)


- Has been titrated down to 1 liter of nasal cannula oxygen 





Dyspnea - possibly 2/2 fluid overload - likely 2/2 decompensated diastolic CHF, 

possible asthma 


- proBNP elevated from admission


- CTA Chest 12/6: no PE, dependent atelectasis noted


- c/w Torsemide; will increase dose to 40 BID


- c/w Prednisone; will reduce dose to QD


- c/w Xopinex inhaled therapy 


- c/w incentive spirometry and acapella 





Non-occlusive Left LE DVT


- Risks and benefits of anticoagulation were discussed with family


- c/w Eliquis for minimum of 3 months


- Dr. Parra (PCP) will continue to manage as an outpatient 





s/p Hematuria


- Reported to have hematuria on 12/9 AM


- Urine still appears yellow; without gross blood


- Urinalysis with 2+ blood


- c/w Jori 





s/p Hypokalemia





Weakness - likely 2/2 deconditioning


- c/w Physical therapy; will likely need sub-acute rehab 





s/p Lactic acidosis


- No evidence of infection





A. fib with episode of RVR


- currently rate controlled


- off telemetry


- c/w metoprolol succinate for rate control


- on full anticoagulation with Eliquis





Parkinson's


- c/w Carbidopa / Levodopa 


- c/w Primidone 





Hx of PE


- previously on Coumadin, with bleeding complication (rectal bleeding) 





DLP


- c/w Atorvastatin 





Hypothyroidism


- c/w Levothyroxine 





Polymyalgia / RA


- c/w Prednisone (will need to return back to baseline dose when breathing has 

improved)





Depression


- c/w Paroxetine 





GERD


- c/w Omeprazole





DVT prophylaxis


- on full anticoagulation with Eliquis





Disposition:


- Last day of antibiotics


- Continue to titrate off oxygen


- Placement at sub-acute rehab





VS,Fishbone, I+O


VS, Fishbone, I+O


Laboratory Tests


12/21/17 06:01








Red Blood Count 3.05 L, Mean Corpuscular Volume 107.9 H, Mean Corpuscular 

Hemoglobin 35.4 H, Mean Corpuscular Hemoglobin Concent 32.8, Red Cell 

Distribution Width 16.2 H, Calcium Level 8.0 L








Vital Signs








  Date Time  Temp Pulse Resp B/P (MAP) Pulse Ox O2 Delivery O2 Flow Rate FiO2


 


12/21/17 09:22  72  151/76    


 


12/21/17 06:00 96.9  20  92 Nasal Cannula 1.0 














I&O- Last 24 Hours up to 6 AM 


 


 12/21/17





 06:00


 


Intake Total 1080 ml


 


Output Total 700 ml


 


Balance 380 ml

















SHAHRZAD POTTER MD Dec 21, 2017 10:54

## 2017-12-22 VITALS — DIASTOLIC BLOOD PRESSURE: 52 MMHG | SYSTOLIC BLOOD PRESSURE: 109 MMHG

## 2017-12-22 VITALS — DIASTOLIC BLOOD PRESSURE: 64 MMHG | SYSTOLIC BLOOD PRESSURE: 122 MMHG

## 2017-12-22 VITALS — DIASTOLIC BLOOD PRESSURE: 42 MMHG | SYSTOLIC BLOOD PRESSURE: 85 MMHG

## 2017-12-22 LAB
ANION GAP SERPL CALC-SCNC: 5 MEQ/L (ref 8–16)
BUN SERPL-MCNC: 19 MG/DL (ref 7–18)
CALCIUM SERPL-MCNC: 7.8 MG/DL (ref 8.8–10.2)
CHLORIDE SERPL-SCNC: 100 MEQ/L (ref 98–107)
CO2 SERPL-SCNC: 35 MEQ/L (ref 21–32)
CREAT SERPL-MCNC: 0.89 MG/DL (ref 0.55–1.02)
ERYTHROCYTE [DISTWIDTH] IN BLOOD BY AUTOMATED COUNT: 16.2 % (ref 11.5–14.5)
GFR SERPL CREATININE-BSD FRML MDRD: > 60 ML/MIN/{1.73_M2} (ref 32–?)
GLUCOSE SERPL-MCNC: 95 MG/DL (ref 83–110)
MCH RBC QN AUTO: 35 PG (ref 27–33)
MCHC RBC AUTO-ENTMCNC: 32.9 G/DL (ref 32–36.5)
MCV RBC AUTO: 106.5 FL (ref 80–96)
NRBC BLD AUTO-RTO: 0.6 % (ref 0–0)
PLATELET # BLD AUTO: 350 10^3/UL (ref 150–450)
POTASSIUM SERPL-SCNC: 3.6 MEQ/L (ref 3.5–5.1)
SODIUM SERPL-SCNC: 140 MEQ/L (ref 136–145)
WBC # BLD AUTO: 12.2 10^3/UL (ref 4–10)

## 2017-12-22 RX ADMIN — FERROUS SULFATE TAB 325 MG (65 MG ELEMENTAL FE) SCH MG: 325 (65 FE) TAB at 09:38

## 2017-12-22 RX ADMIN — MULTIPLE VITAMINS W/ MINERALS TAB SCH TAB: TAB at 09:40

## 2017-12-22 RX ADMIN — POLYETHYLENE GLYCOL 3350 SCH PKT: 17 POWDER, FOR SOLUTION ORAL at 09:40

## 2017-12-22 RX ADMIN — DIVALPROEX SODIUM SCH MG: 500 TABLET, FILM COATED, EXTENDED RELEASE ORAL at 09:41

## 2017-12-22 RX ADMIN — POTASSIUM CHLORIDE SCH MEQ: 750 TABLET, EXTENDED RELEASE ORAL at 09:39

## 2017-12-22 RX ADMIN — LEVOTHYROXINE SODIUM SCH MCG: 150 TABLET ORAL at 05:59

## 2017-12-22 RX ADMIN — PRIMIDONE SCH MG: 50 TABLET ORAL at 10:45

## 2017-12-22 RX ADMIN — OXYBUTYNIN CHLORIDE SCH MG: 5 TABLET, EXTENDED RELEASE ORAL at 09:39

## 2017-12-22 RX ADMIN — METOPROLOL SUCCINATE SCH MG: 25 TABLET, EXTENDED RELEASE ORAL at 09:41

## 2017-12-22 RX ADMIN — OMEPRAZOLE SCH MG: 20 CAPSULE, DELAYED RELEASE ORAL at 09:40

## 2017-12-22 RX ADMIN — TORSEMIDE SCH MG: 20 TABLET ORAL at 09:40

## 2017-12-22 RX ADMIN — LORATADINE SCH MG: 10 TABLET ORAL at 09:41

## 2017-12-22 RX ADMIN — APIXABAN SCH MG: 2.5 TABLET, FILM COATED ORAL at 10:45

## 2017-12-22 RX ADMIN — DOCUSATE SODIUM SCH MG: 100 CAPSULE, LIQUID FILLED ORAL at 09:38

## 2017-12-22 RX ADMIN — CARBIDOPA AND LEVODOPA SCH TAB: 25; 100 TABLET ORAL at 09:40

## 2017-12-22 RX ADMIN — ASPIRIN SCH MG: 81 TABLET ORAL at 09:38

## 2017-12-22 NOTE — DSES
DATE OF ADMISSION:  12/06/2017

DATE OF DISCHARGE: 12/22/2017

 

ATTENDING PHYSICIAN: Lila Dumont MD, Mert Crow MD

 

DICTATED BY: Lila Dumont MD

 

PRIMARY CARE PROVIDER: Aida Parra MD

 

REFERRING PHYSICIAN: None.

 

CONSULTING PHYSICIANS: None.

 

CONDITION ON DISCHARGE: Stable.

 

FINAL DIAGNOSIS:  Dyspnea, likely secondary to fluid overload. Possible asthma.

Possible pneumonia.

 

PROCEDURES: None.

 

HISTORY OF PRESENT ILLNESS: The patient is an 87-year-old  female with

past medical history of atrial fibrillation, diastolic congestive heart failure

(CHF), mitral valve and aortic valve regurgitation, ventricular ectopy,

Parkinson's disorder, pulmonary embolism, previously on Coumadin with bleeding

complications, dyslipidemia, hypothyroidism, polymyalgia, rheumatoid arthritis,

who presented to the ER with shortness of breath and a nonproductive cough for 10

days. She was found down at home and brought to the emergency room. She was found

to have a nonocclusive deep venous thrombosis (DVT) and a negative workup for

pulmonary embolism (PE). She was started on Eliquis. She appeared to have fluid

overload and started on IV diuretics.

 

HOSPITAL COURSE:

1. Sepsis secondary to pneumonia. Possible urinary tract infection. Reports

improvement in her breathing. Denies any cough.

2. Mild leukocytosis. Remains afebrile. CRP have been improving. WBC has been

elevated throughout the hospital course. She is status post antibiotic course.

She was on Levaquin and completed her antibiotic therapy. She is status post

vancomycin. She has initially been put on supplemental oxygen, however, has been

titrated off it and is currently on room air.

3. Dyspnea. Possible secondary to fluid overload. Likely secondary to

decompensated diastolic CHF. Possibly asthma. Pro BMP is elevated on admission.

CTA chest on 12/06/2017 was negative for any pulmonary embolism. Reveals

dependent atelectasis. The patient was put on torsemide on a higher dose from her

outpatient dose. However, it has been decreased and now returned back to her home

dose. The patient was also put on a higher dose of prednisone from her dose and

has been titrated down. She will be continuing to titrate down on her prednisone

and return back to her home dose of prednisone 1 mg twice a day. Will be

continuing with Xopenex while inpatient and continue with albuterol as an

outpatient. The patient has been advised to continue with incentive spirometry

and Acapella.

4. Nonocclusive left lower extremity DVT. Risks and benefits of antiplatelets

were discussed with family. The patient has been put on Eliquis for a minimum of

3 months. Dr. Parra her primary care physician has been advised and she will be

following up as an outpatient.

5. Status post hematuria. Reports having hematuria on 12/09 a.m. urine still

appears yellow without any gross blood. Urinanalysis was positive for 2+ blood.

The patient will be continued with Eliquis.

6. Status post hypokalemia.

7. Weakness secondary to deconditioning. Continue with physical therapy. Will

likely need subacute rehabilitation.

8. Lactic acidosis. No evidence of infection.

9. Atrial fibrillation with episodes of rapid ventricular response (RVR).

Currently rate controlled. Off telemetry. Continue with metoprolol succinate for

rate control. On full anticoagulation with Eliquis.

10. Parkinson's. Continue with carbidopa levodopa and primidone.

11. History of pulmonary embolism, previously on Coumadin but has been stopped.

There has been bleeding complications, which included rectal bleeding. 12.

Dyslipidemia. Continue with atorvastatin.

13. Hypothyroidism. Continue with levothyroxine.

14. Polymyalgia and rheumatoid arthritis. The patient is on prednisone as an

outpatient however currently she is on higher dose of prednisone and she will be

tapered down back to her home dose.

15. Depression. Continue with paroxetine.

16. Gastroesophageal reflux disease (GERD). Continue with omeprazole.

17. DVT prophylaxis. She is put on full anticoagulation with Eliquis.

 

DISCHARGE MEDICATION: The patient was discharged on the following medication

list:

- Eliquis 2.5 mg oral twice a day

- aspirin 81 mg oral daily

- docusate sodium 100 mg oral twice a day

- prednisone 10 mg to be taken as directed until she is returned back to her home

dose of 1 mg twice a day

- atorvastatin 10 mg oral at bedtime

- carbidopa levodopa 1.5 tablets oral four times a day

- Refresh Tears 1 drop in each eye as needed dry eyes

- divalproex 500 mg oral twice a day

- ferrous sulfate 325 mg oral daily

- folic acid 100 mg oral at bedtime

- levalbuterol 1 puff inhaled four times a day as needed shortness of breath

- levothyroxine 150 mg oral daily

- loratadine 10 mg oral daily

- methotrexate 12.5 mg oral as directed

- metoprolol succinate 25 mg oral daily

- multivitamin 1 tablet oral daily

- omeprazole 20 mg oral twice a day

- oxybutynin 5 mg oral daily

- paroxetine 20 mg oral daily

- potassium chloride 10 mEq oral twice a day

- prednisone 1 mg oral twice a day

- primidone 200 mg oral daily

- primidone 150 oral at bedtime

- torsemide 40 mg oral twice a day

 

STOPPED MEDICATIONS: Include:

- aspirin 325 mg oral daily

 

DISCHARGE INSTRUCTIONS: The patient has been advised to followup with her primary

care provider who is Dr. Aida Parra within the next 7 days. She has been advised

to remain compliant with treatment plan and medications and to return to the

emergency room if she experiences any problems.

 

TIME SPENT ON DISCHARGE: Greater than 35 minutes.

## 2017-12-27 ENCOUNTER — HOSPITAL ENCOUNTER (OUTPATIENT)
Dept: HOSPITAL 53 - SKLAB4 | Age: 82
End: 2017-12-27
Attending: INTERNAL MEDICINE

## 2017-12-27 DIAGNOSIS — R00.1: Primary | ICD-10-CM

## 2017-12-27 DIAGNOSIS — R55: ICD-10-CM

## 2017-12-28 ENCOUNTER — HOSPITAL ENCOUNTER (OUTPATIENT)
Dept: HOSPITAL 53 - SKLAB4 | Age: 82
End: 2017-12-28
Attending: INTERNAL MEDICINE

## 2017-12-28 DIAGNOSIS — R00.1: Primary | ICD-10-CM

## 2017-12-28 LAB
ANION GAP: 13 MEQ/L (ref 8–16)
BLOOD UREA NITROGEN: 19 MG/DL (ref 7–18)
CALCIUM LEVEL: 8.1 MG/DL (ref 8.8–10.2)
CARBON DIOXIDE LEVEL: 28 MEQ/L (ref 21–32)
CHLORIDE LEVEL: 97 MEQ/L (ref 98–107)
CREATININE FOR GFR: 0.75 MG/DL (ref 0.55–1.02)
GFR SERPL CREATININE-BSD FRML MDRD: > 60 ML/MIN/{1.73_M2} (ref 32–?)
GLUCOSE, FASTING: 124 MG/DL (ref 83–110)
POTASSIUM SERUM: 3.9 MEQ/L (ref 3.5–5.1)
SODIUM LEVEL: 138 MEQ/L (ref 136–145)

## 2017-12-29 ENCOUNTER — HOSPITAL ENCOUNTER (OUTPATIENT)
Dept: HOSPITAL 53 - SKLAB4 | Age: 82
End: 2017-12-29
Attending: FAMILY MEDICINE

## 2017-12-29 DIAGNOSIS — R00.1: Primary | ICD-10-CM

## 2018-01-02 ENCOUNTER — HOSPITAL ENCOUNTER (OUTPATIENT)
Dept: HOSPITAL 53 - SKLAB4 | Age: 83
End: 2018-01-02
Attending: INTERNAL MEDICINE

## 2018-01-02 DIAGNOSIS — E03.9: Primary | ICD-10-CM

## 2018-01-02 DIAGNOSIS — I50.9: ICD-10-CM

## 2018-01-02 DIAGNOSIS — D64.9: ICD-10-CM

## 2018-01-02 LAB
ALBUMIN/GLOBULIN RATIO: 0.88 (ref 1–1.93)
ALBUMIN: 2.3 GM/DL (ref 3.2–5.2)
ALKALINE PHOSPHATASE: 71 U/L (ref 45–117)
ALT SERPL W P-5'-P-CCNC: 27 U/L (ref 12–78)
ANION GAP: 12 MEQ/L (ref 8–16)
AST SERPL-CCNC: 29 U/L (ref 7–37)
BILIRUBIN,TOTAL: 0.2 MG/DL (ref 0.2–1)
BLOOD UREA NITROGEN: 17 MG/DL (ref 7–18)
CALCIUM LEVEL: 7.7 MG/DL (ref 8.8–10.2)
CARBON DIOXIDE LEVEL: 27 MEQ/L (ref 21–32)
CHLORIDE LEVEL: 100 MEQ/L (ref 98–107)
CREATININE FOR GFR: 0.69 MG/DL (ref 0.55–1.02)
FERRITIN: 79 NG/ML (ref 8–252)
GFR SERPL CREATININE-BSD FRML MDRD: > 60 ML/MIN/{1.73_M2} (ref 32–?)
GLUCOSE, FASTING: 126 MG/DL (ref 83–110)
HEMATOCRIT: 33.7 % (ref 36–47)
HEMOGLOBIN: 11 G/DL (ref 12–16)
IRON (FE): 41 UG/DL (ref 50–170)
IRON SATN MFR SERPL: 18.1 % (ref 13.2–45)
MEAN CORPUSCULAR HEMOGLOBIN: 35.8 PG (ref 27–33)
MEAN CORPUSCULAR HGB CONC: 32.6 G/DL (ref 32–36.5)
MEAN CORPUSCULAR VOLUME: 109.8 FL (ref 80–96)
NRBC BLD AUTO-RTO: 0.9 % (ref 0–0)
PLATELET COUNT, AUTOMATED: 208 10^3/UL (ref 150–450)
POTASSIUM SERUM: 4 MEQ/L (ref 3.5–5.1)
RED BLOOD COUNT: 3.07 10^6/UL (ref 4–5.4)
RED CELL DISTRIBUTION WIDTH: 18.8 % (ref 11.5–14.5)
SODIUM LEVEL: 139 MEQ/L (ref 136–145)
THYROID STIMULATING HORMONE: 38.1 UIU/ML (ref 0.36–3.74)
TOTAL IRON BINDING CAPACITY: 227 UG/DL (ref 250–450)
TOTAL PROTEIN: 4.9 GM/DL (ref 6.4–8.2)
WHITE BLOOD COUNT: 5.7 10^3/UL (ref 4–10)

## 2018-01-24 ENCOUNTER — HOSPITAL ENCOUNTER (OUTPATIENT)
Dept: HOSPITAL 53 - SKLAB4 | Age: 83
End: 2018-01-24
Attending: INTERNAL MEDICINE

## 2018-01-24 DIAGNOSIS — I50.30: Primary | ICD-10-CM

## 2018-01-24 LAB
ANION GAP: 5 MEQ/L (ref 8–16)
BLOOD UREA NITROGEN: 17 MG/DL (ref 7–18)
CALCIUM LEVEL: 8.2 MG/DL (ref 8.8–10.2)
CARBON DIOXIDE LEVEL: 35 MEQ/L (ref 21–32)
CHLORIDE LEVEL: 99 MEQ/L (ref 98–107)
CREATININE FOR GFR: 0.64 MG/DL (ref 0.55–1.02)
GFR SERPL CREATININE-BSD FRML MDRD: > 60 ML/MIN/{1.73_M2} (ref 32–?)
GLUCOSE, FASTING: 90 MG/DL (ref 70–100)
POTASSIUM SERUM: 3.6 MEQ/L (ref 3.5–5.1)
SODIUM LEVEL: 139 MEQ/L (ref 136–145)

## 2018-02-06 ENCOUNTER — HOSPITAL ENCOUNTER (OUTPATIENT)
Dept: HOSPITAL 53 - M LAB REF | Age: 83
End: 2018-02-06
Attending: SURGERY
Payer: MEDICARE

## 2018-02-06 DIAGNOSIS — D36.7: Primary | ICD-10-CM

## 2018-02-06 PROCEDURE — 88305 TISSUE EXAM BY PATHOLOGIST: CPT

## 2018-02-26 ENCOUNTER — HOSPITAL ENCOUNTER (OUTPATIENT)
Dept: HOSPITAL 53 - SKLAB4 | Age: 83
End: 2018-02-26
Attending: INTERNAL MEDICINE
Payer: MEDICARE

## 2018-02-26 DIAGNOSIS — E03.9: Primary | ICD-10-CM

## 2018-02-26 DIAGNOSIS — D64.9: ICD-10-CM

## 2018-02-26 LAB
ANION GAP: 8 MEQ/L (ref 8–16)
BLOOD UREA NITROGEN: 11 MG/DL (ref 7–18)
CALCIUM LEVEL: 8.2 MG/DL (ref 8.8–10.2)
CARBON DIOXIDE LEVEL: 34 MEQ/L (ref 21–32)
CHLORIDE LEVEL: 101 MEQ/L (ref 98–107)
CREATININE FOR GFR: 0.51 MG/DL (ref 0.55–1.3)
FREE T4: 1.33 NG/DL (ref 0.76–1.46)
GFR SERPL CREATININE-BSD FRML MDRD: > 60 ML/MIN/{1.73_M2} (ref 32–?)
GLUCOSE, FASTING: 83 MG/DL (ref 70–100)
HEMATOCRIT: 33.5 % (ref 36–47)
HEMOGLOBIN: 10.9 G/DL (ref 12–16)
MEAN CORPUSCULAR HEMOGLOBIN: 35.7 PG (ref 27–33)
MEAN CORPUSCULAR HGB CONC: 32.5 G/DL (ref 32–36.5)
MEAN CORPUSCULAR VOLUME: 109.8 FL (ref 80–96)
NRBC BLD AUTO-RTO: 0 % (ref 0–0)
PLATELET COUNT, AUTOMATED: 277 10^3/UL (ref 150–450)
POTASSIUM SERUM: 3.9 MEQ/L (ref 3.5–5.1)
RED BLOOD COUNT: 3.05 10^6/UL (ref 4–5.4)
RED CELL DISTRIBUTION WIDTH: 17.1 % (ref 11.5–14.5)
SODIUM LEVEL: 143 MEQ/L (ref 136–145)
THYROID STIMULATING HORMONE: 3.83 UIU/ML (ref 0.36–3.74)
WHITE BLOOD COUNT: 5.4 10^3/UL (ref 4–10)

## 2018-02-26 PROCEDURE — 84443 ASSAY THYROID STIM HORMONE: CPT

## 2018-03-19 ENCOUNTER — HOSPITAL ENCOUNTER (OUTPATIENT)
Dept: HOSPITAL 53 - SKLAB4 | Age: 83
End: 2018-03-19
Attending: INTERNAL MEDICINE

## 2018-03-19 DIAGNOSIS — M06.9: ICD-10-CM

## 2018-03-19 DIAGNOSIS — D64.9: Primary | ICD-10-CM

## 2018-03-19 DIAGNOSIS — G20: ICD-10-CM

## 2018-03-19 LAB
FERRITIN: 77 NG/ML (ref 8–252)
HEMATOCRIT: 36.9 % (ref 36–47)
HEMOGLOBIN: 12.1 G/DL (ref 12–16)
IRON (FE): 35 UG/DL (ref 50–170)
IRON SATN MFR SERPL: 16.1 % (ref 13.2–45)
MEAN CORPUSCULAR HEMOGLOBIN: 34.7 PG (ref 27–33)
MEAN CORPUSCULAR HGB CONC: 32.8 G/DL (ref 32–36.5)
MEAN CORPUSCULAR VOLUME: 105.7 FL (ref 80–96)
NRBC BLD AUTO-RTO: 0 % (ref 0–0)
PLATELET COUNT, AUTOMATED: 230 10^3/UL (ref 150–450)
RED BLOOD COUNT: 3.49 10^6/UL (ref 4–5.4)
RED CELL DISTRIBUTION WIDTH: 15.9 % (ref 11.5–14.5)
TOTAL IRON BINDING CAPACITY: 217 UG/DL (ref 250–450)
WHITE BLOOD COUNT: 5.1 10^3/UL (ref 4–10)

## 2018-04-23 ENCOUNTER — HOSPITAL ENCOUNTER (OUTPATIENT)
Dept: HOSPITAL 53 - SKLAB4 | Age: 83
End: 2018-04-23
Attending: INTERNAL MEDICINE
Payer: MEDICARE

## 2018-04-23 DIAGNOSIS — R19.7: Primary | ICD-10-CM

## 2018-04-23 LAB
ANION GAP: 8 MEQ/L (ref 8–16)
BLOOD UREA NITROGEN: 10 MG/DL (ref 7–18)
CALCIUM LEVEL: 8.2 MG/DL (ref 8.8–10.2)
CARBON DIOXIDE LEVEL: 30 MEQ/L (ref 21–32)
CHLORIDE LEVEL: 100 MEQ/L (ref 98–107)
CREATININE FOR GFR: 0.42 MG/DL (ref 0.55–1.3)
GFR SERPL CREATININE-BSD FRML MDRD: > 60 ML/MIN/{1.73_M2} (ref 32–?)
GLUCOSE, FASTING: 105 MG/DL (ref 70–100)
POTASSIUM SERUM: 2.8 MEQ/L (ref 3.5–5.1)
SODIUM LEVEL: 138 MEQ/L (ref 136–145)

## 2018-04-23 PROCEDURE — 36415 COLL VENOUS BLD VENIPUNCTURE: CPT

## 2018-04-24 ENCOUNTER — HOSPITAL ENCOUNTER (OUTPATIENT)
Dept: HOSPITAL 53 - SKLAB4 | Age: 83
End: 2018-04-24
Attending: INTERNAL MEDICINE
Payer: MEDICARE

## 2018-04-24 DIAGNOSIS — E87.6: Primary | ICD-10-CM

## 2018-04-24 LAB
ANION GAP: 7 MEQ/L (ref 8–16)
BLOOD UREA NITROGEN: 11 MG/DL (ref 7–18)
CALCIUM LEVEL: 8.3 MG/DL (ref 8.8–10.2)
CARBON DIOXIDE LEVEL: 33 MEQ/L (ref 21–32)
CHLORIDE LEVEL: 102 MEQ/L (ref 98–107)
CREATININE FOR GFR: 0.46 MG/DL (ref 0.55–1.3)
GFR SERPL CREATININE-BSD FRML MDRD: > 60 ML/MIN/{1.73_M2} (ref 32–?)
GLUCOSE, FASTING: 82 MG/DL (ref 70–100)
POTASSIUM SERUM: 3.5 MEQ/L (ref 3.5–5.1)
SODIUM LEVEL: 142 MEQ/L (ref 136–145)

## 2018-04-24 PROCEDURE — 36415 COLL VENOUS BLD VENIPUNCTURE: CPT

## 2018-05-03 ENCOUNTER — HOSPITAL ENCOUNTER (OUTPATIENT)
Dept: HOSPITAL 53 - SKLAB4 | Age: 83
End: 2018-05-03
Attending: INTERNAL MEDICINE
Payer: MEDICARE

## 2018-05-03 DIAGNOSIS — I50.9: Primary | ICD-10-CM

## 2018-05-03 LAB
ANION GAP: 5 MEQ/L (ref 8–16)
BLOOD UREA NITROGEN: 13 MG/DL (ref 7–18)
CALCIUM LEVEL: 8 MG/DL (ref 8.8–10.2)
CARBON DIOXIDE LEVEL: 33 MEQ/L (ref 21–32)
CHLORIDE LEVEL: 102 MEQ/L (ref 98–107)
CREATININE FOR GFR: 0.39 MG/DL (ref 0.55–1.3)
GFR SERPL CREATININE-BSD FRML MDRD: > 60 ML/MIN/{1.73_M2} (ref 32–?)
GLUCOSE, FASTING: 84 MG/DL (ref 70–100)
POTASSIUM SERUM: 3.9 MEQ/L (ref 3.5–5.1)
SODIUM LEVEL: 140 MEQ/L (ref 136–145)

## 2018-05-03 PROCEDURE — 36415 COLL VENOUS BLD VENIPUNCTURE: CPT

## 2018-05-07 ENCOUNTER — HOSPITAL ENCOUNTER (OUTPATIENT)
Dept: HOSPITAL 53 - SKLAB4 | Age: 83
End: 2018-05-07
Attending: INTERNAL MEDICINE
Payer: MEDICARE

## 2018-05-07 DIAGNOSIS — F32.9: Primary | ICD-10-CM

## 2018-05-07 LAB — THYROID STIMULATING HORMONE: 10.2 UIU/ML (ref 0.36–3.74)

## 2018-05-07 PROCEDURE — 84443 ASSAY THYROID STIM HORMONE: CPT

## 2018-05-08 ENCOUNTER — HOSPITAL ENCOUNTER (OUTPATIENT)
Dept: HOSPITAL 53 - M LAB | Age: 83
End: 2018-05-08
Attending: INTERNAL MEDICINE
Payer: MEDICARE

## 2018-05-08 ENCOUNTER — HOSPITAL ENCOUNTER (OUTPATIENT)
Dept: HOSPITAL 53 - SKLAB4 | Age: 83
End: 2018-05-08
Attending: INTERNAL MEDICINE
Payer: MEDICARE

## 2018-05-08 DIAGNOSIS — R30.0: Primary | ICD-10-CM

## 2018-05-08 DIAGNOSIS — R30.0: ICD-10-CM

## 2018-05-08 DIAGNOSIS — Z79.899: ICD-10-CM

## 2018-05-08 DIAGNOSIS — Z51.81: ICD-10-CM

## 2018-05-08 DIAGNOSIS — R53.83: Primary | ICD-10-CM

## 2018-05-08 DIAGNOSIS — G20: ICD-10-CM

## 2018-05-08 LAB
AMORPH SED URNS QL MICRO: (no result)
APPEARANCE, URINE: (no result)
BACTERIA UR QL AUTO: (no result)
BILIRUBIN, URINE AUTO: NEGATIVE
BLOOD, URINE BLOOD: NEGATIVE
GLUCOSE, URINE (UA) AUTO: NEGATIVE MG/DL
KETONE, URINE AUTO: (no result) MG/DL
LEUKOCYTE ESTERASE UR QL STRIP.AUTO: (no result)
MUCUS, URINE: (no result)
NITRITE, URINE AUTO: NEGATIVE
PH,URINE: 5 UNITS (ref 5–9)
PROT UR QL STRIP.AUTO: NEGATIVE MG/DL
RBC, URINE AUTO: 4 /HPF (ref 0–3)
SPECIFIC GRAVITY URINE AUTO: 1.01 (ref 1–1.03)
SQUAMOUS #/AREA URNS AUTO: 1 /HPF (ref 0–6)
UROBILINOGEN, URINE AUTO: 0.2 MG/DL (ref 0–2)
VALPROIC ACID (DEPAKOTE): 49.2 UG/ML (ref 50–100)
WBC, URINE AUTO: 30 /HPF (ref 0–3)

## 2018-05-08 PROCEDURE — 80188 ASSAY OF PRIMIDONE: CPT

## 2018-05-10 LAB
PHENOBARBITAL (PRIMIDONE): 22 UG/ML (ref 15–40)
PRIMIDONE, SERUM: 8.5 UG/ML (ref 5–12)

## 2018-05-22 ENCOUNTER — HOSPITAL ENCOUNTER (EMERGENCY)
Dept: HOSPITAL 53 - M ED | Age: 83
Discharge: HOME | End: 2018-05-22
Payer: MEDICARE

## 2018-05-22 DIAGNOSIS — G20: ICD-10-CM

## 2018-05-22 DIAGNOSIS — E03.9: ICD-10-CM

## 2018-05-22 DIAGNOSIS — Z79.01: ICD-10-CM

## 2018-05-22 DIAGNOSIS — F33.9: ICD-10-CM

## 2018-05-22 DIAGNOSIS — Z87.19: ICD-10-CM

## 2018-05-22 DIAGNOSIS — Z86.69: ICD-10-CM

## 2018-05-22 DIAGNOSIS — R55: Primary | ICD-10-CM

## 2018-05-22 DIAGNOSIS — R94.31: ICD-10-CM

## 2018-05-22 LAB
ANION GAP: 9 MEQ/L (ref 8–16)
BASO #: 0.1 10^3/UL (ref 0–0.2)
BASO %: 0.3 % (ref 0–1)
BLOOD UREA NITROGEN: 14 MG/DL (ref 7–18)
CALCIUM LEVEL: 8.6 MG/DL (ref 8.8–10.2)
CARBON DIOXIDE LEVEL: 31 MEQ/L (ref 21–32)
CHLORIDE LEVEL: 101 MEQ/L (ref 98–107)
CK MB CFR.DF SERPL CALC: 2.63
CK SERPL-CCNC: 38 U/L (ref 26–192)
CK-MB VALUE MASS: < 1 NG/ML (ref ?–3.6)
CREATININE FOR GFR: 0.52 MG/DL (ref 0.55–1.3)
EOS #: 0.1 10^3/UL (ref 0–0.5)
EOSINOPHIL NFR BLD AUTO: 0.7 % (ref 0–3)
GFR SERPL CREATININE-BSD FRML MDRD: > 60 ML/MIN/{1.73_M2} (ref 32–?)
GLUCOSE BLDC GLUCOMTR-MCNC: 159 MG/DL (ref 83–110)
GLUCOSE, FASTING: 145 MG/DL (ref 70–100)
HEMATOCRIT: 42.3 % (ref 36–47)
HEMOGLOBIN: 14.3 G/DL (ref 12–15.5)
IMMATURE GRANULOCYTE %: 0.5 % (ref 0–3)
LYMPH #: 1.4 10^3/UL (ref 1.5–4.5)
LYMPH %: 9.3 % (ref 24–44)
MEAN CORPUSCULAR HEMOGLOBIN: 35.8 PG (ref 27–33)
MEAN CORPUSCULAR HGB CONC: 33.8 G/DL (ref 32–36.5)
MEAN CORPUSCULAR VOLUME: 106 FL (ref 80–96)
MONO #: 0.9 10^3/UL (ref 0–0.8)
MONO %: 6.3 % (ref 0–5)
NEUTROPHILS #: 12.1 10^3/UL (ref 1.8–7.7)
NEUTROPHILS %: 82.9 % (ref 36–66)
NRBC BLD AUTO-RTO: 0 % (ref 0–0)
PLATELET COUNT, AUTOMATED: 233 10^3/UL (ref 150–450)
POTASSIUM SERUM: 3.8 MEQ/L (ref 3.5–5.1)
RED BLOOD COUNT: 3.99 10^6/UL (ref 4–5.4)
RED CELL DISTRIBUTION WIDTH: 17.6 % (ref 11.5–14.5)
SODIUM LEVEL: 141 MEQ/L (ref 136–145)
THYROID STIMULATING HORMONE: 8.56 UIU/ML (ref 0.36–3.74)
TROPONIN I: < 0.02 NG/ML (ref ?–0.1)
WHITE BLOOD COUNT: 14.7 10^3/UL (ref 4–10)

## 2018-05-22 PROCEDURE — 71045 X-RAY EXAM CHEST 1 VIEW: CPT

## 2018-05-24 ENCOUNTER — HOSPITAL ENCOUNTER (OUTPATIENT)
Dept: HOSPITAL 53 - SKLAB4 | Age: 83
End: 2018-05-24
Attending: INTERNAL MEDICINE
Payer: MEDICARE

## 2018-05-24 DIAGNOSIS — R53.83: Primary | ICD-10-CM

## 2018-05-24 LAB
APPEARANCE, URINE: (no result)
BACTERIA UR QL AUTO: (no result)
BILIRUBIN, URINE AUTO: NEGATIVE
BLOOD, URINE BLOOD: (no result)
GLUCOSE, URINE (UA) AUTO: NEGATIVE MG/DL
KETONE, URINE AUTO: NEGATIVE MG/DL
LEUKOCYTE ESTERASE UR QL STRIP.AUTO: (no result)
NITRITE, URINE AUTO: POSITIVE
PH,URINE: 6 UNITS (ref 5–9)
PROT UR QL STRIP.AUTO: (no result) MG/DL
RBC, URINE AUTO: (no result) /HPF (ref 0–3)
SPECIFIC GRAVITY URINE AUTO: 1.01 (ref 1–1.03)
SQUAMOUS #/AREA URNS AUTO: 0 /HPF (ref 0–6)
UROBILINOGEN, URINE AUTO: 0.2 MG/DL (ref 0–2)
WBC, URINE AUTO: (no result) /HPF (ref 0–3)

## 2018-05-24 PROCEDURE — 81001 URINALYSIS AUTO W/SCOPE: CPT

## 2018-05-29 ENCOUNTER — HOSPITAL ENCOUNTER (OUTPATIENT)
Dept: HOSPITAL 53 - SKLAB4 | Age: 83
End: 2018-05-29
Attending: INTERNAL MEDICINE
Payer: MEDICARE

## 2018-05-29 DIAGNOSIS — M06.9: Primary | ICD-10-CM

## 2018-05-29 LAB
ANION GAP: 6 MEQ/L (ref 8–16)
BLOOD UREA NITROGEN: 10 MG/DL (ref 7–18)
CALCIUM LEVEL: 8.3 MG/DL (ref 8.8–10.2)
CARBON DIOXIDE LEVEL: 34 MEQ/L (ref 21–32)
CHLORIDE LEVEL: 101 MEQ/L (ref 98–107)
CREATININE FOR GFR: 0.43 MG/DL (ref 0.55–1.3)
GFR SERPL CREATININE-BSD FRML MDRD: > 60 ML/MIN/{1.73_M2} (ref 32–?)
GLUCOSE, FASTING: 90 MG/DL (ref 70–100)
POTASSIUM SERUM: 3.7 MEQ/L (ref 3.5–5.1)
SODIUM LEVEL: 141 MEQ/L (ref 136–145)

## 2018-05-29 PROCEDURE — 36415 COLL VENOUS BLD VENIPUNCTURE: CPT

## 2018-06-18 ENCOUNTER — HOSPITAL ENCOUNTER (OUTPATIENT)
Dept: HOSPITAL 53 - SKLAB4 | Age: 83
End: 2018-06-18
Attending: INTERNAL MEDICINE
Payer: MEDICARE

## 2018-06-18 DIAGNOSIS — G20: ICD-10-CM

## 2018-06-18 DIAGNOSIS — E03.9: Primary | ICD-10-CM

## 2018-06-18 DIAGNOSIS — M06.9: ICD-10-CM

## 2018-06-18 LAB
ALBUMIN/GLOBULIN RATIO: 1 (ref 1–1.93)
ALBUMIN: 2.6 GM/DL (ref 3.2–5.2)
ALKALINE PHOSPHATASE: 96 U/L (ref 45–117)
ALT SERPL W P-5'-P-CCNC: 17 U/L (ref 12–78)
ANION GAP: 11 MEQ/L (ref 8–16)
AST SERPL-CCNC: 13 U/L (ref 7–37)
BILIRUBIN,TOTAL: 0.3 MG/DL (ref 0.2–1)
BLOOD UREA NITROGEN: 11 MG/DL (ref 7–18)
CALCIUM LEVEL: 8.5 MG/DL (ref 8.8–10.2)
CARBON DIOXIDE LEVEL: 30 MEQ/L (ref 21–32)
CHLORIDE LEVEL: 101 MEQ/L (ref 98–107)
CREATININE FOR GFR: 0.45 MG/DL (ref 0.55–1.3)
FERRITIN: 79 NG/ML (ref 8–252)
GFR SERPL CREATININE-BSD FRML MDRD: > 60 ML/MIN/{1.73_M2} (ref 32–?)
GLUCOSE, FASTING: 121 MG/DL (ref 70–100)
HEMATOCRIT: 39.2 % (ref 36–47)
HEMOGLOBIN: 13.2 G/DL (ref 12–15.5)
IRON (FE): 54 UG/DL (ref 50–170)
IRON SATN MFR SERPL: 25.8 % (ref 13.2–45)
MEAN CORPUSCULAR HEMOGLOBIN: 36 PG (ref 27–33)
MEAN CORPUSCULAR HGB CONC: 33.7 G/DL (ref 32–36.5)
MEAN CORPUSCULAR VOLUME: 106.8 FL (ref 80–96)
NRBC BLD AUTO-RTO: 0 % (ref 0–0)
PLATELET COUNT, AUTOMATED: 224 10^3/UL (ref 150–450)
POTASSIUM SERUM: 3.6 MEQ/L (ref 3.5–5.1)
RED BLOOD COUNT: 3.67 10^6/UL (ref 4–5.4)
RED CELL DISTRIBUTION WIDTH: 16.1 % (ref 11.5–14.5)
SODIUM LEVEL: 142 MEQ/L (ref 136–145)
THYROID STIMULATING HORMONE: 2.01 UIU/ML (ref 0.36–3.74)
TOTAL IRON BINDING CAPACITY: 209 UG/DL (ref 250–450)
TOTAL PROTEIN: 5.2 GM/DL (ref 6.4–8.2)
WHITE BLOOD COUNT: 6 10^3/UL (ref 4–10)

## 2018-06-18 PROCEDURE — 83550 IRON BINDING TEST: CPT

## 2018-07-03 ENCOUNTER — HOSPITAL ENCOUNTER (OUTPATIENT)
Dept: HOSPITAL 53 - SKLAB4 | Age: 83
End: 2018-07-03
Attending: INTERNAL MEDICINE
Payer: MEDICARE

## 2018-07-03 DIAGNOSIS — E03.9: Primary | ICD-10-CM

## 2018-07-03 LAB — THYROID STIMULATING HORMONE: 1.45 UIU/ML (ref 0.36–3.74)

## 2018-07-03 PROCEDURE — 84443 ASSAY THYROID STIM HORMONE: CPT

## 2018-09-17 ENCOUNTER — HOSPITAL ENCOUNTER (OUTPATIENT)
Dept: HOSPITAL 53 - SKLAB4 | Age: 83
End: 2018-09-17
Attending: INTERNAL MEDICINE
Payer: MEDICARE

## 2018-09-17 DIAGNOSIS — D64.9: Primary | ICD-10-CM

## 2018-09-17 LAB
FERRITIN: 58 NG/ML (ref 8–252)
HEMATOCRIT: 39.4 % (ref 36–47)
HEMOGLOBIN: 13.3 G/DL (ref 12–15.5)
MEAN CORPUSCULAR HEMOGLOBIN: 35.1 PG (ref 27–33)
MEAN CORPUSCULAR HGB CONC: 33.8 G/DL (ref 32–36.5)
MEAN CORPUSCULAR VOLUME: 104 FL (ref 80–96)
NRBC BLD AUTO-RTO: 0.2 % (ref 0–0)
PLATELET COUNT, AUTOMATED: 265 10^3/UL (ref 150–450)
RED BLOOD COUNT: 3.79 10^6/UL (ref 4–5.4)
RED CELL DISTRIBUTION WIDTH: 14.4 % (ref 11.5–14.5)
WHITE BLOOD COUNT: 8.4 10^3/UL (ref 4–10)

## 2018-09-17 PROCEDURE — 82728 ASSAY OF FERRITIN: CPT

## 2018-09-28 ENCOUNTER — HOSPITAL ENCOUNTER (OUTPATIENT)
Dept: HOSPITAL 53 - SKLAB4 | Age: 83
End: 2018-09-28
Attending: INTERNAL MEDICINE
Payer: MEDICARE

## 2018-09-28 DIAGNOSIS — M25.561: Primary | ICD-10-CM

## 2018-09-28 PROCEDURE — 73564 X-RAY EXAM KNEE 4 OR MORE: CPT

## 2018-12-17 ENCOUNTER — HOSPITAL ENCOUNTER (OUTPATIENT)
Dept: HOSPITAL 53 - SKLAB4 | Age: 83
End: 2018-12-17
Attending: INTERNAL MEDICINE
Payer: MEDICARE

## 2018-12-17 DIAGNOSIS — E03.9: ICD-10-CM

## 2018-12-17 DIAGNOSIS — G20: ICD-10-CM

## 2018-12-17 DIAGNOSIS — D64.9: Primary | ICD-10-CM

## 2018-12-17 LAB
ANION GAP: 8 MEQ/L (ref 8–16)
BLOOD UREA NITROGEN: 13 MG/DL (ref 7–18)
CALCIUM LEVEL: 8.3 MG/DL (ref 8.8–10.2)
CARBON DIOXIDE LEVEL: 32 MEQ/L (ref 21–32)
CHLORIDE LEVEL: 102 MEQ/L (ref 98–107)
CREATININE FOR GFR: 0.46 MG/DL (ref 0.55–1.3)
FERRITIN: 56 NG/ML (ref 8–252)
GFR SERPL CREATININE-BSD FRML MDRD: > 60 ML/MIN/{1.73_M2} (ref 32–?)
GLUCOSE, FASTING: 92 MG/DL (ref 70–100)
HEMATOCRIT: 36.7 % (ref 36–47)
HEMOGLOBIN: 12.2 G/DL (ref 12–15.5)
MEAN CORPUSCULAR HEMOGLOBIN: 33.8 PG (ref 27–33)
MEAN CORPUSCULAR HGB CONC: 33.2 G/DL (ref 32–36.5)
MEAN CORPUSCULAR VOLUME: 101.7 FL (ref 80–96)
NRBC BLD AUTO-RTO: 0.3 % (ref 0–0)
PLATELET COUNT, AUTOMATED: 236 10^3/UL (ref 150–450)
POTASSIUM SERUM: 3.6 MEQ/L (ref 3.5–5.1)
RED BLOOD COUNT: 3.61 10^6/UL (ref 4–5.4)
RED CELL DISTRIBUTION WIDTH: 14.9 % (ref 11.5–14.5)
SODIUM LEVEL: 142 MEQ/L (ref 136–145)
THYROID STIMULATING HORMONE: 0.01 UIU/ML (ref 0.36–3.74)
WHITE BLOOD COUNT: 7.1 10^3/UL (ref 4–10)

## 2018-12-17 PROCEDURE — 84443 ASSAY THYROID STIM HORMONE: CPT

## 2018-12-26 ENCOUNTER — HOSPITAL ENCOUNTER (OUTPATIENT)
Dept: HOSPITAL 53 - SKLAB4 | Age: 83
End: 2018-12-26
Attending: INTERNAL MEDICINE
Payer: MEDICARE

## 2018-12-26 DIAGNOSIS — R30.0: Primary | ICD-10-CM

## 2018-12-26 LAB
APPEARANCE UR: CLEAR
BACTERIA UR QL AUTO: (no result)
BILIRUB UR QL STRIP.AUTO: NEGATIVE
GLUCOSE UR QL STRIP.AUTO: NEGATIVE MG/DL
HGB UR QL STRIP.AUTO: (no result)
KETONES UR QL STRIP.AUTO: NEGATIVE MG/DL
LEUKOCYTE ESTERASE UR QL STRIP.AUTO: (no result)
NITRITE UR QL STRIP.AUTO: NEGATIVE
PH UR STRIP.AUTO: 7 UNITS (ref 5–9)
PROT UR QL STRIP.AUTO: NEGATIVE MG/DL
RBC # UR AUTO: 9 /HPF (ref 0–3)
SP GR UR STRIP.AUTO: 1 (ref 1–1.03)
SQUAMOUS #/AREA URNS AUTO: 1 /HPF (ref 0–6)
TRANS CELLS #/AREA URNS HPF: <1 /HPF
UROBILINOGEN UR QL STRIP.AUTO: 0.2 MG/DL (ref 0–2)
WBC #/AREA URNS AUTO: 19 /HPF (ref 0–3)

## 2019-03-07 ENCOUNTER — HOSPITAL ENCOUNTER (OUTPATIENT)
Dept: HOSPITAL 53 - SKLAB4 | Age: 84
End: 2019-03-07
Attending: INTERNAL MEDICINE
Payer: MEDICARE

## 2019-03-07 DIAGNOSIS — E03.9: Primary | ICD-10-CM

## 2019-03-07 LAB
ALBUMIN SERPL BCG-MCNC: 2.7 GM/DL (ref 3.2–5.2)
ALT SERPL W P-5'-P-CCNC: 14 U/L (ref 12–78)
BILIRUB SERPL-MCNC: 0.3 MG/DL (ref 0.2–1)
BUN SERPL-MCNC: 12 MG/DL (ref 7–18)
CALCIUM SERPL-MCNC: 8.3 MG/DL (ref 8.8–10.2)
CHLORIDE SERPL-SCNC: 98 MEQ/L (ref 98–107)
CO2 SERPL-SCNC: 33 MEQ/L (ref 21–32)
CREAT SERPL-MCNC: 0.52 MG/DL (ref 0.55–1.3)
FERRITIN SERPL-MCNC: 76 NG/ML (ref 8–252)
GFR SERPL CREATININE-BSD FRML MDRD: > 60 ML/MIN/{1.73_M2} (ref 32–?)
GLUCOSE SERPL-MCNC: 143 MG/DL (ref 70–100)
HCT VFR BLD AUTO: 39.9 % (ref 36–47)
HGB BLD-MCNC: 13.3 G/DL (ref 12–15.5)
MCH RBC QN AUTO: 34.5 PG (ref 27–33)
MCHC RBC AUTO-ENTMCNC: 33.3 G/DL (ref 32–36.5)
MCV RBC AUTO: 103.6 FL (ref 80–96)
PLATELET # BLD AUTO: 246 10^3/UL (ref 150–450)
POTASSIUM SERPL-SCNC: 3.5 MEQ/L (ref 3.5–5.1)
PROT SERPL-MCNC: 5.4 GM/DL (ref 6.4–8.2)
RBC # BLD AUTO: 3.85 10^6/UL (ref 4–5.4)
SODIUM SERPL-SCNC: 141 MEQ/L (ref 136–145)
TSH SERPL DL<=0.005 MIU/L-ACNC: 0.09 UIU/ML (ref 0.36–3.74)
WBC # BLD AUTO: 7.4 10^3/UL (ref 4–10)

## 2019-06-06 ENCOUNTER — HOSPITAL ENCOUNTER (OUTPATIENT)
Dept: HOSPITAL 53 - SKLAB4 | Age: 84
End: 2019-06-06
Attending: INTERNAL MEDICINE
Payer: MEDICARE

## 2019-06-06 DIAGNOSIS — D64.9: ICD-10-CM

## 2019-06-06 DIAGNOSIS — E03.9: Primary | ICD-10-CM

## 2019-06-06 LAB
BUN SERPL-MCNC: 10 MG/DL (ref 7–18)
CALCIUM SERPL-MCNC: 8.3 MG/DL (ref 8.8–10.2)
CHLORIDE SERPL-SCNC: 100 MEQ/L (ref 98–107)
CO2 SERPL-SCNC: 33 MEQ/L (ref 21–32)
CREAT SERPL-MCNC: 0.5 MG/DL (ref 0.55–1.3)
FERRITIN SERPL-MCNC: 124 NG/ML (ref 8–252)
GFR SERPL CREATININE-BSD FRML MDRD: > 60 ML/MIN/{1.73_M2} (ref 32–?)
GLUCOSE SERPL-MCNC: 94 MG/DL (ref 70–100)
HCT VFR BLD AUTO: 34.7 % (ref 36–47)
HGB BLD-MCNC: 11.3 G/DL (ref 12–15.5)
MCH RBC QN AUTO: 35.4 PG (ref 27–33)
MCHC RBC AUTO-ENTMCNC: 32.6 G/DL (ref 32–36.5)
MCV RBC AUTO: 108.8 FL (ref 80–96)
PLATELET # BLD AUTO: 223 10^3/UL (ref 150–450)
POTASSIUM SERPL-SCNC: 3.5 MEQ/L (ref 3.5–5.1)
RBC # BLD AUTO: 3.19 10^6/UL (ref 4–5.4)
SODIUM SERPL-SCNC: 141 MEQ/L (ref 136–145)
TSH SERPL DL<=0.005 MIU/L-ACNC: 1.83 UIU/ML (ref 0.36–3.74)
WBC # BLD AUTO: 6.7 10^3/UL (ref 4–10)

## 2019-06-12 ENCOUNTER — HOSPITAL ENCOUNTER (OUTPATIENT)
Dept: HOSPITAL 53 - SSVSNF4 | Age: 84
End: 2019-06-12
Attending: INTERNAL MEDICINE

## 2019-06-12 DIAGNOSIS — G20: Primary | ICD-10-CM

## 2019-06-12 LAB
IRON SATN MFR SERPL: 19.2 % (ref 13.2–45)
IRON SERPL-MCNC: 41 UG/DL (ref 50–170)
TIBC SERPL-MCNC: 214 UG/DL (ref 250–450)
VALPROATE SERPL-MCNC: 23.1 UG/ML (ref 50–100)

## 2019-06-19 ENCOUNTER — HOSPITAL ENCOUNTER (OUTPATIENT)
Dept: HOSPITAL 53 - SSVSNF4 | Age: 84
End: 2019-06-19
Attending: INTERNAL MEDICINE

## 2019-06-19 DIAGNOSIS — M06.9: Primary | ICD-10-CM

## 2019-06-19 LAB
BUN SERPL-MCNC: 10 MG/DL (ref 7–18)
CALCIUM SERPL-MCNC: 8.3 MG/DL (ref 8.8–10.2)
CHLORIDE SERPL-SCNC: 100 MEQ/L (ref 98–107)
CO2 SERPL-SCNC: 24 MEQ/L (ref 21–32)
CREAT SERPL-MCNC: 0.61 MG/DL (ref 0.55–1.3)
GFR SERPL CREATININE-BSD FRML MDRD: > 60 ML/MIN/{1.73_M2} (ref 32–?)
GLUCOSE SERPL-MCNC: 129 MG/DL (ref 70–100)
HCT VFR BLD AUTO: 36.5 % (ref 36–47)
HGB BLD-MCNC: 11.7 G/DL (ref 12–15.5)
MCH RBC QN AUTO: 34.4 PG (ref 27–33)
MCHC RBC AUTO-ENTMCNC: 32.1 G/DL (ref 32–36.5)
MCV RBC AUTO: 107.4 FL (ref 80–96)
PLATELET # BLD AUTO: 288 10^3/UL (ref 150–450)
POTASSIUM SERPL-SCNC: 4 MEQ/L (ref 3.5–5.1)
RBC # BLD AUTO: 3.4 10^6/UL (ref 4–5.4)
SODIUM SERPL-SCNC: 137 MEQ/L (ref 136–145)
WBC # BLD AUTO: 6.3 10^3/UL (ref 4–10)

## 2019-08-08 ENCOUNTER — HOSPITAL ENCOUNTER (OUTPATIENT)
Dept: HOSPITAL 53 - SSVSNF4 | Age: 84
End: 2019-08-08
Attending: INTERNAL MEDICINE

## 2019-08-08 DIAGNOSIS — R41.82: Primary | ICD-10-CM

## 2019-08-08 LAB
ALBUMIN SERPL BCG-MCNC: 1.8 GM/DL (ref 3.2–5.2)
ALT SERPL W P-5'-P-CCNC: 6 U/L (ref 12–78)
APPEARANCE UR: (no result)
BACTERIA UR QL AUTO: (no result)
BILIRUB SERPL-MCNC: 0.3 MG/DL (ref 0.2–1)
BILIRUB UR QL STRIP.AUTO: NEGATIVE
BUN SERPL-MCNC: 16 MG/DL (ref 7–18)
CALCIUM SERPL-MCNC: 8 MG/DL (ref 8.8–10.2)
CHLORIDE SERPL-SCNC: 98 MEQ/L (ref 98–107)
CO2 SERPL-SCNC: 26 MEQ/L (ref 21–32)
CREAT SERPL-MCNC: 1.16 MG/DL (ref 0.55–1.3)
GFR SERPL CREATININE-BSD FRML MDRD: 46.8 ML/MIN/{1.73_M2} (ref 32–?)
GLUCOSE SERPL-MCNC: 120 MG/DL (ref 70–100)
GLUCOSE UR QL STRIP.AUTO: NEGATIVE MG/DL
HCT VFR BLD AUTO: 31.6 % (ref 36–47)
HGB BLD-MCNC: 10.3 G/DL (ref 12–15.5)
HGB UR QL STRIP.AUTO: (no result)
KETONES UR QL STRIP.AUTO: (no result) MG/DL
LEUKOCYTE ESTERASE UR QL STRIP.AUTO: (no result)
MCH RBC QN AUTO: 34.9 PG (ref 27–33)
MCHC RBC AUTO-ENTMCNC: 32.6 G/DL (ref 32–36.5)
MCV RBC AUTO: 107.1 FL (ref 80–96)
MUCOUS THREADS URNS QL MICRO: (no result)
NITRITE UR QL STRIP.AUTO: NEGATIVE
PH UR STRIP.AUTO: 5 UNITS (ref 5–9)
PLATELET # BLD AUTO: 533 10^3/UL (ref 150–450)
POTASSIUM SERPL-SCNC: 4.2 MEQ/L (ref 3.5–5.1)
PROT SERPL-MCNC: 5.3 GM/DL (ref 6.4–8.2)
PROT UR QL STRIP.AUTO: (no result) MG/DL
RBC # BLD AUTO: 2.95 10^6/UL (ref 4–5.4)
RBC # UR AUTO: (no result) /HPF (ref 0–3)
SODIUM SERPL-SCNC: 137 MEQ/L (ref 136–145)
SP GR UR STRIP.AUTO: 1.02 (ref 1–1.03)
SQUAMOUS #/AREA URNS AUTO: 3 /HPF (ref 0–6)
UROBILINOGEN UR QL STRIP.AUTO: 0.2 MG/DL (ref 0–2)
WBC # BLD AUTO: 33.1 10^3/UL (ref 4–10)
WBC #/AREA URNS AUTO: (no result) /HPF (ref 0–3)

## 2019-08-09 ENCOUNTER — HOSPITAL ENCOUNTER (OUTPATIENT)
Dept: HOSPITAL 53 - SSVSNF4 | Age: 84
End: 2019-08-09
Attending: INTERNAL MEDICINE

## 2019-08-09 DIAGNOSIS — R50.9: Primary | ICD-10-CM

## 2019-08-09 LAB
BUN SERPL-MCNC: 21 MG/DL (ref 7–18)
CALCIUM SERPL-MCNC: 8.3 MG/DL (ref 8.8–10.2)
CHLORIDE SERPL-SCNC: 96 MEQ/L (ref 98–107)
CO2 SERPL-SCNC: 27 MEQ/L (ref 21–32)
CREAT SERPL-MCNC: 1.35 MG/DL (ref 0.55–1.3)
GFR SERPL CREATININE-BSD FRML MDRD: 39.3 ML/MIN/{1.73_M2} (ref 32–?)
GLUCOSE SERPL-MCNC: 115 MG/DL (ref 70–100)
HCT VFR BLD AUTO: 32 % (ref 36–47)
HGB BLD-MCNC: 10.2 G/DL (ref 12–15.5)
MCH RBC QN AUTO: 35.5 PG (ref 27–33)
MCHC RBC AUTO-ENTMCNC: 31.9 G/DL (ref 32–36.5)
MCV RBC AUTO: 111.5 FL (ref 80–96)
PLATELET # BLD AUTO: 484 10^3/UL (ref 150–450)
POTASSIUM SERPL-SCNC: 4.2 MEQ/L (ref 3.5–5.1)
RBC # BLD AUTO: 2.87 10^6/UL (ref 4–5.4)
SODIUM SERPL-SCNC: 135 MEQ/L (ref 136–145)
WBC # BLD AUTO: 20.7 10^3/UL (ref 4–10)

## 2019-08-09 NOTE — REP
CHEST, ONE VIEW:

 

HISTORY:  Fever.

 

COMPARISON:  05/22/2018.

 

The lungs are clear.  The heart is normal in size.  The pulmonary vasculature is

normal in appearance.

 

IMPRESSION:

 

No acute disease.

 

 

Electronically Signed by

Sarwat Serna MD 08/09/2019 02:41 P

## 2019-08-11 ENCOUNTER — HOSPITAL ENCOUNTER (INPATIENT)
Dept: HOSPITAL 53 - M ED | Age: 84
LOS: 4 days | Discharge: NURSING, REHAB OR LONG-TERM CARE | DRG: 242 | End: 2019-08-15
Attending: INTERNAL MEDICINE | Admitting: INTERNAL MEDICINE
Payer: MEDICARE

## 2019-08-11 VITALS — SYSTOLIC BLOOD PRESSURE: 118 MMHG | DIASTOLIC BLOOD PRESSURE: 56 MMHG

## 2019-08-11 VITALS — DIASTOLIC BLOOD PRESSURE: 60 MMHG | SYSTOLIC BLOOD PRESSURE: 126 MMHG

## 2019-08-11 VITALS — BODY MASS INDEX: 39.33 KG/M2 | HEIGHT: 68 IN | WEIGHT: 259.48 LBS

## 2019-08-11 VITALS — DIASTOLIC BLOOD PRESSURE: 59 MMHG | SYSTOLIC BLOOD PRESSURE: 123 MMHG

## 2019-08-11 DIAGNOSIS — K21.9: ICD-10-CM

## 2019-08-11 DIAGNOSIS — B96.4: ICD-10-CM

## 2019-08-11 DIAGNOSIS — Z79.899: ICD-10-CM

## 2019-08-11 DIAGNOSIS — R53.2: ICD-10-CM

## 2019-08-11 DIAGNOSIS — Z86.718: ICD-10-CM

## 2019-08-11 DIAGNOSIS — Z66: ICD-10-CM

## 2019-08-11 DIAGNOSIS — I48.0: ICD-10-CM

## 2019-08-11 DIAGNOSIS — M06.9: ICD-10-CM

## 2019-08-11 DIAGNOSIS — I27.20: ICD-10-CM

## 2019-08-11 DIAGNOSIS — G93.41: ICD-10-CM

## 2019-08-11 DIAGNOSIS — Z99.3: ICD-10-CM

## 2019-08-11 DIAGNOSIS — N13.30: ICD-10-CM

## 2019-08-11 DIAGNOSIS — I44.2: Primary | ICD-10-CM

## 2019-08-11 DIAGNOSIS — E78.5: ICD-10-CM

## 2019-08-11 DIAGNOSIS — N39.0: ICD-10-CM

## 2019-08-11 DIAGNOSIS — N20.0: ICD-10-CM

## 2019-08-11 DIAGNOSIS — E03.9: ICD-10-CM

## 2019-08-11 DIAGNOSIS — I70.0: ICD-10-CM

## 2019-08-11 DIAGNOSIS — N18.3: ICD-10-CM

## 2019-08-11 DIAGNOSIS — F03.90: ICD-10-CM

## 2019-08-11 DIAGNOSIS — H35.30: ICD-10-CM

## 2019-08-11 DIAGNOSIS — M35.3: ICD-10-CM

## 2019-08-11 DIAGNOSIS — Z79.01: ICD-10-CM

## 2019-08-11 DIAGNOSIS — Z86.711: ICD-10-CM

## 2019-08-11 DIAGNOSIS — I42.2: ICD-10-CM

## 2019-08-11 DIAGNOSIS — E66.9: ICD-10-CM

## 2019-08-11 DIAGNOSIS — G20: ICD-10-CM

## 2019-08-11 DIAGNOSIS — I50.33: ICD-10-CM

## 2019-08-11 LAB
BASOPHILS # BLD AUTO: 0 10^3/UL (ref 0–0.2)
BASOPHILS NFR BLD AUTO: 0.1 % (ref 0–1)
BUN SERPL-MCNC: 15 MG/DL (ref 7–18)
CALCIUM SERPL-MCNC: 8 MG/DL (ref 8.8–10.2)
CHLORIDE SERPL-SCNC: 100 MEQ/L (ref 98–107)
CK MB CFR.DF SERPL CALC: 1.56
CK MB SERPL-MCNC: < 1 NG/ML (ref ?–3.6)
CK SERPL-CCNC: 64 U/L (ref 26–192)
CO2 SERPL-SCNC: 23 MEQ/L (ref 21–32)
CREAT SERPL-MCNC: 0.93 MG/DL (ref 0.55–1.3)
EOSINOPHIL # BLD AUTO: 0 10^3/UL (ref 0–0.5)
EOSINOPHIL NFR BLD AUTO: 0.2 % (ref 0–3)
GFR SERPL CREATININE-BSD FRML MDRD: > 60 ML/MIN/{1.73_M2} (ref 32–?)
GLUCOSE SERPL-MCNC: 116 MG/DL (ref 70–100)
HCT VFR BLD AUTO: 33.2 % (ref 36–47)
HGB BLD-MCNC: 10.9 G/DL (ref 12–15.5)
LYMPHOCYTES # BLD AUTO: 0.5 10^3/UL (ref 1.5–4.5)
LYMPHOCYTES NFR BLD AUTO: 3.4 % (ref 24–44)
MCH RBC QN AUTO: 35 PG (ref 27–33)
MCHC RBC AUTO-ENTMCNC: 32.8 G/DL (ref 32–36.5)
MCV RBC AUTO: 106.8 FL (ref 80–96)
MONOCYTES # BLD AUTO: 0.4 10^3/UL (ref 0–0.8)
MONOCYTES NFR BLD AUTO: 2.5 % (ref 0–5)
NEUTROPHILS # BLD AUTO: 13.9 10^3/UL (ref 1.8–7.7)
NEUTROPHILS NFR BLD AUTO: 93.2 % (ref 36–66)
PLATELET # BLD AUTO: 469 10^3/UL (ref 150–450)
POTASSIUM SERPL-SCNC: 4.9 MEQ/L (ref 3.5–5.1)
RBC # BLD AUTO: 3.11 10^6/UL (ref 4–5.4)
SODIUM SERPL-SCNC: 135 MEQ/L (ref 136–145)
T4 FREE SERPL-MCNC: 1.62 NG/DL (ref 0.76–1.46)
TROPONIN I SERPL-MCNC: < 0.02 NG/ML (ref ?–0.1)
TSH SERPL DL<=0.005 MIU/L-ACNC: 10.5 UIU/ML (ref 0.36–3.74)
WBC # BLD AUTO: 14.9 10^3/UL (ref 4–10)

## 2019-08-11 RX ADMIN — POLYVINYL ALCOHOL SCH DROP: 14 SOLUTION/ DROPS OPHTHALMIC at 21:41

## 2019-08-11 RX ADMIN — TORSEMIDE SCH MG: 20 TABLET ORAL at 18:12

## 2019-08-11 RX ADMIN — CARBIDOPA AND LEVODOPA SCH TAB: 25; 100 TABLET ORAL at 21:00

## 2019-08-11 RX ADMIN — CEFTRIAXONE SCH MLS/HR: 1 INJECTION, POWDER, FOR SOLUTION INTRAMUSCULAR; INTRAVENOUS at 17:09

## 2019-08-11 RX ADMIN — CARBIDOPA AND LEVODOPA SCH TAB: 25; 100 TABLET ORAL at 18:13

## 2019-08-11 RX ADMIN — APIXABAN SCH MG: 5 TABLET, FILM COATED ORAL at 21:00

## 2019-08-11 RX ADMIN — SODIUM CHLORIDE SCH MLS/HR: 9 INJECTION, SOLUTION INTRAVENOUS at 18:21

## 2019-08-11 RX ADMIN — FOLIC ACID SCH MG: 1 TABLET ORAL at 21:00

## 2019-08-11 RX ADMIN — DOCUSATE SODIUM SCH MG: 100 CAPSULE, LIQUID FILLED ORAL at 21:00

## 2019-08-11 RX ADMIN — ATORVASTATIN CALCIUM SCH MG: 10 TABLET, FILM COATED ORAL at 21:00

## 2019-08-11 RX ADMIN — ESCITALOPRAM OXALATE SCH MG: 10 TABLET, FILM COATED ORAL at 21:00

## 2019-08-11 RX ADMIN — PRIMIDONE SCH MG: 50 TABLET ORAL at 21:00

## 2019-08-11 RX ADMIN — POTASSIUM CHLORIDE SCH MEQ: 750 TABLET, EXTENDED RELEASE ORAL at 21:00

## 2019-08-11 RX ADMIN — DIVALPROEX SODIUM SCH MG: 250 TABLET, DELAYED RELEASE ORAL at 21:00

## 2019-08-11 RX ADMIN — METOPROLOL SUCCINATE ONE MG: 25 TABLET, EXTENDED RELEASE ORAL at 14:24

## 2019-08-11 RX ADMIN — METOPROLOL SUCCINATE ONE MG: 25 TABLET, EXTENDED RELEASE ORAL at 14:48

## 2019-08-11 NOTE — REP
HISTORY: Sepsis and pyelonephritis.

 

The lack of intravenous contrast significantly decreases the sensitivity of the

exam.

 

There are no priors for comparison.

 

There is significant respiratory motion artifact obscuring the lung bases.

Subsegmental atelectatic changes are suspected in the dependent portions

bilaterally, right greater than left. I cannot rule out the possibility of

bilateral lower lung field consolidations and I cannot rule out the possibility

of small pleural effusions.

 

Motion artifact is seen throughout the exam.

 

Limited evaluation of the liver and spleen show no gross abnormalities. Limited

evaluation of the gallbladder, pancreas and adrenal glands show no gross

abnormalities.

 

There are multiple left nephroliths. There is no left-sided obstructive

phenomenon. There is no evidence of a left ureterolith.

 

There are innumerable calcifications in the right renal pelvis causing mild

hydro. Spray artifact arising from the patient's right hip prosthesis precludes

evaluation of the distal right ureter. The imaged portion of the right ureter

shows no evidence of ureterolithiasis. There is a low density lesion in the

superior pole of the right kidney. Due to the artifact this cannot be adequately

evaluated. Limited evaluation of the abdominal aorta and periaortic regions show

calcific atherosclerotic change. There is no evidence of gross free fluid or

gross free air, however, due to the artifact, small amounts of free fluid and

free air could be obscured.

 

CT PELVIS:

 

There are bilateral pelvic phleboliths. Spray artifact arises from her right hip

prosthesis obscuring multiple pelvic images. There is no evidence of a gross

pelvic abnormality.

 

Bone window technique throughout the examination shows previous THR on the right.

The bones are demineralized. Chronic changes are seen involving the spine and

left hip.

 

IMPRESSION:

 

1. Lung base images as described above. Pneumonia/atelectasis/effusions cannot be

ruled out.

 

2. Bilateral renal calculi as described above with mild right-sided

hydronephrosis.

 

3. Other findings and limitations as described above.

 

 

Electronically Signed by

Barber Bryant DO 08/11/2019 04:02 P

## 2019-08-11 NOTE — REP
REASON: Altered mental status.

 

PRIORS: None.

 

There is a large area of encephalomalacia in the right frontal lobe and in the

anterior right temporal lobe. There is associated colpocephaly of the ipsilateral

frontal horn of the right lateral ventricle. There are no acute extra-axial fluid

collections. Scattered low density is seen throughout the deep cerebral white

matter. There is no evidence of an acute intracranial hemorrhage.

 

There has been previous right craniotomy. There is no acute skull fracture.

 

IMPRESSION:

 

Chronic brain changes as described above. Most likely postprocedural. I have no

priors for comparison. There is no evidence of an acute intracranial hemorrhagic

or nonhemorrhagic event on this noncontrast enhanced brain CT.

 

 

Electronically Signed by

Barber Bryant DO 08/12/2019 10:01 A

## 2019-08-11 NOTE — REP
HISTORY: Dyspnea.

 

COMPARISON: 08/09/2019

 

The technique utilized in obtaining the radiograph has magnified the cardiac

silhouette and accentuated the interstitial markings.

 

 

 

There is cardiomegaly accentuated by technique. No acute patchy parenchymal

opacities or pleural effusions have developed.  Fibrotic changes are seen with

basilar predominance, status quo. There is no change in the osseous structures.

 

IMPRESSION:

 

Chronic changes as described above. No evidence of acute cardiopulmonary

disease.

 

 

Electronically Signed by

Barber Bryant DO 08/11/2019 01:43 P

## 2019-08-11 NOTE — HPEPDOC
General


Date of Admission


Aug 11, 2019 at 14:57


Date of Service:  Aug 11, 2019


Chief Complaint


The patient is a 89-year-old female admitted with a reason for visit of Chronic 

A Fib W/Rvr; Uti.


Source:  Family, Old records


Exam Limitations:  Mild cognitive slowing


Timing/Duration:  Day(s) (decline over the past 5 day)


Severity:  Moderate


Associated Symptoms:  Loss of appetite, Shortness of breath





History of Present Illness


This is an 89-year-old female who resides in a nursing home. Family notes and 

received report that the patient was not breathing well. Over the past 5 days. 

They've noted decreased appetite and poor intake of food or fluids. They also 

note that she's become more confused than usual.


Patient is unable to give any history at this time due to remarkable lethargy.





Home Medications


Scheduled


Acetaminophen (Acetaminophen ER) 650 Mg Tablet.er, 650 MG PO BID, (Reported)


Apixaban (Eliquis) 5 Mg Tablet, 5 MG PO BID, (Reported)


Atorvastatin Calcium (Atorvastatin Calcium) 10 Mg Tablet, 10 MG PO QHS, 

(Reported)


Carbidopa/Levodopa (Carbidopa-Levodopa  Tab) 1 Each Tablet, 1.5 TAB PO 

QID, (Reported)


   0800/1200/1600/2000 


Carboxymethylcellulose Sodium (Refresh Tears) 15 Ml Drops, 1 DROP OU BID, 

(Reported)


Cefpodoxime Proxetil (Cefpodoxime Proxetil) 100 Mg Tablet, 100 MG PO BID, 

(Reported)


   x 3 DAYS, START 8/10/19-8/13/19 


Divalproex Sodium (Depakote) 250 Mg Tablet.dr, 250 MG PO BID, (Reported)


Docusate Sodium (Docusate Sodium) 100 Mg Capsule, 200 MG PO BID, (Reported)


Escitalopram Oxalate (Escitalopram Oxalate) 10 Mg Tablet, 10 MG PO QHS, (Repor

mando)


Ferrous Sulfate (Ferrous Sulfate) 325 Mg Tablet, 325 MG PO DAILY, (Reported)


Folic Acid (Folic Acid) 1 Mg Tablet, 1 MG PO QHS, (Reported)


Loratadine (Loratadine) 10 Mg Tab.rapdis, 10 MG PO DAILY, (Reported)


Methotrexate Sodium (Methotrexate) 2.5 Mg Tablet, 12.5 MG PO QWEEK, (Reported)


   WEDNESDAYS 


Metoprolol Succinate (Metoprolol Succinate) 25 Mg Tab.er.24h, 25 MG PO DAILY, 

(Reported)


Multivitamins (Thera M Plus Tablet) 1 Each Tablet, 1 TAB PO DAILY, (Reported)


Potassium Chloride (Potassium Chloride) 20 Meq Tab.er.prt, 20 MEQ PO BID, 

(Reported)


Prednisone (Prednisone) 1 Mg Tablet, 2 MG PO DAILY, (Reported)


Primidone (Primidone) 50 Mg Tablet, 200 MG PO DAILY, (Reported)


Primidone (Primidone) 50 Mg Tablet, 150 MG PO QHS, (Reported)


Torsemide (Torsemide) 20 Mg Tablet, 60 MG PO BID, (Reported)


   0800/1600 





Scheduled PRN


Acetaminophen (Acetaminophen) 325 Mg Tablet, 650 MG PO Q4H PRN for PAIN, 

(Reported)


Acetaminophen (Acetaminophen) 650 Mg Supp.rect, 650 MG TN Q4H PRN for PAIN / 

FEVER, (Reported)


Capsaicin (Capsaicin) 0.025% Cream..g., 1 APLCT TOP TID PRN for DISCOMFORT, 

(Reported)


   APPLY TO LOWER EXTREMITIES 


Levalbuterol Hydrochloride (Xopenex Hfa) 15 Gm Hfa.aer.ad, 1 PUFF INH QID PRN 

for SHORTNESS OF BREATH, (Reported)


Magnesium Hydroxide (Milk of Magnesia) 400 Mg/5 Ml Oral.susp, 2,400 MG PO DAILY 

PRN for CONSTIPATION, (Reported)


Sodium Phosphate,Mono-Dibasic (Enema) 133 Ml Enema, 1 MAGGY TN DAILY PRN for 

CONSTIPATION, (Reported)





Allergies


Coded Allergies:  


     No Known Drug Allergies (Verified  Allergy, Unknown, 8/11/19)





Past Medical History


Medical History


Past medical history is obtained primarily from the medical record and includes:

Chronic atrial fibrillation for which she is on chronic anticoagulation, history

of DVT and pulmonary embolus, polymyalgia rheumatica, rheumatoid arthritis, 

Parkinson's disease, dyslipidemia, history of angioedema, lower extremity 

lymphedema, osteoarthritis, chronic diastolic congestive heart failure, 

hypothyroidism, chronic kidney disease stage III.


Surgical History


Surgical history includes: Resection of optic nerve meningioma, hysterectomy, 

total hip arthroplasty





Family History


Significant Family History:  Heart disease





Social History


* Smoker:  former Smoker (Remote since age 60)


Alcohol:  rarely


Drugs:  denies


Recent Travel/Sick Contacts:  Denies: Recent travel, Recent sick contacts


Psychosocial History:  No pertinent psych hx


The patient is a retired . Her daughter is her medical health care 

proxy. CODE STATUS is DNI, DNR





A-FIB/CHADSVASC


A-FIB History


Current/History of A-Fib/PAF?:  Yes


Current PO Anticoag Therapy:  Yes





Review of Systems


Other systems


Review of 10 systems is otherwise negative except as stated in the HPI





Physical Examination


General Exam:  Positive: Other (the patient was minimally responsive, and so we 

started talking about food that she likes)


Eye Exam:  Positive: PERRLA, Conjunctiva & lids normal


ENT Exam:  Positive: Atraumatic, Other ENT (dry oral mucosa)


Neck Exam:  Positive: Supple


Chest Exam:  Positive: Clear to auscultation, Normal air movement


Heart Exam:  Positive: Tachycardic, Irregular Rhythm


Telemetry:  Positive: Atrial fibrillation


Abdomen Exam:  Positive: Normal bowel sounds, Soft, Other (central obesity); 


   Negative: Tenderness, Hepatospenomegaly


Extremity Exam:  Positive: Other (enlarged joints. Lower extremity lymphedema 

apparent)


Skin Exam:  Positive: Nl turgor and temperature, Other skin issue (patient has a

number of skin tags around her neck); 


   Negative: Breakdown, Lesion


Neuro Exam:  Positive: Cranial Nerves 3-12 NL


Psych Exam:  Positive: Other (oriented to self and family)





Vital Signs





Vital Signs








  Date Time  Temp Pulse Resp B/P (MAP) Pulse Ox O2 Delivery O2 Flow Rate FiO2


 


8/11/19 15:15  118  115/59 (77) 96   


 


8/11/19 12:45      Room Air  


 


8/11/19 11:49 98.5  31     











Laboratory Data


Labs 24H


Laboratory Tests 2


8/11/19 12:44: Lactic Acid Level 2.0


8/11/19 12:55: 


Immature Granulocyte % (Auto) 0.6, White Blood Count 14.9H, Red Blood Count 

3.11L, Hemoglobin 10.9L, Hematocrit 33.2L, Mean Corpuscular Volume 106.8H, Mean 

Corpuscular Hemoglobin 35.0H, Mean Corpuscular Hemoglobin Concent 32.8, Red Cell

Distribution Width 16.4H, Platelet Count 469H, Neutrophils (%) (Auto) 93.2H, 

Lymphocytes (%) (Auto) 3.4L, Monocytes (%) (Auto) 2.5, Eosinophils (%) (Auto) 

0.2, Basophils (%) (Auto) 0.1, Neutrophils # (Auto) 13.9H, Lymphocytes # (Auto) 

0.5L, Monocytes # (Auto) 0.4, Eosinophils # (Auto) 0.0, Basophils # (Auto) 0.0, 

Nucleated Red Blood Cells % (auto) 0.0, Anion Gap 12, Glomerular Filtration Rate

> 60.0, Blood Urea Nitrogen 15, Creatinine 0.93, Sodium Level 135L, Potassium 

Level 4.9, Chloride Level 100, Carbon Dioxide Level 23, Calcium Level 8.0L, 

Total Creatine Kinase 64, Creatine Kinase MB < 1.0, Creatine Kinase MB Relative 

Index 1.56, Troponin I < 0.02, Thyroid Stimulating Hormone (TSH) 10.500H, Free 

Thyroxine 1.62H


CBC/BMP


Laboratory Tests


8/11/19 12:55








Red Blood Count 3.11 L, Mean Corpuscular Volume 106.8 H, Mean Corpuscular 

Hemoglobin 35.0 H, Mean Corpuscular Hemoglobin Concent 32.8, Red Cell 

Distribution Width 16.4 H, Neutrophils (%) (Auto) 93.2 H, Lymphocytes (%) (Auto)

3.4 L, Monocytes (%) (Auto) 2.5, Eosinophils (%) (Auto) 0.2, Basophils (%) 

(Auto) 0.1, Neutrophils # (Auto) 13.9 H, Lymphocytes # (Auto) 0.5 L, Monocytes #

(Auto) 0.4, Eosinophils # (Auto) 0.0, Basophils # (Auto) 0.0, Calcium Level 8.0 

L, Total Creatine Kinase 64


Microbiology





Microbiology


8/11/19 Blood Culture, Received


          Pending


8/11/19 Blood Culture, Received


          Pending





 Assessment/Plan


1. Metabolic encephalopathy.


Infection and dehydration contributory. Patient has a urinary tract infection 

and poor oral intake over the past 5 days. We will institute some gentle IV 

hydration and empiric antibiotics. We anticipate she will become more alert.





2. Atrial fibrillation with rapid ventricular response.


The patient has chronic atrial fibrillation for which she is on chronic 

anticoagulation. She apparently did not receive her usual rate control 

medication in the form of Toprol-XL this morning. She has received that in 

addition to digoxin and we are hopeful of return of rate control. She will 

continue with her anticoagulation with Eliquis.





3. Urinary tract infection


Urine cultures positive for Proteus mirabilis. Patient will be placed on 

ceftriaxone per sensitivities. CT scan notes large right renal stone, some small

left renal stones with right-sided hydronephrosis.





4. Parkinson's disease, polymyalgia rheumatica, rheumatoid arthritis


The patient is generally immobile. She is mobilized wheelchair by Hilda lift. 

Additionally, she is on a ground texture diet. We will otherwise continue her 

associated medications for management.





Plan / VTE


VTE Prophylaxis Ordered?:  Yes (the patient is chronically on Eliquis)





Plan


IVF:  Initiate


Diet:  Continue Current (requires ground texture)


Activity:  Bedrest (mobilize by Hilda lift to wheelchair)


Medications:  Bowel Regimen, Start Antibiotics


Diagnostics:  Repeat Labs in AM, Obtain Cultures


Anticipated Discharge:  Nursing Home


Advanced Directives:  MOLST Form is available (the patient has a MOLST form that

states DNR, DNI.)











ALEXX PHAN MD         Aug 11, 2019 16:05

## 2019-08-12 ENCOUNTER — HOSPITAL ENCOUNTER (OUTPATIENT)
Dept: HOSPITAL 53 - SSVSNF4 | Age: 84
End: 2019-08-12
Attending: INTERNAL MEDICINE

## 2019-08-12 VITALS — DIASTOLIC BLOOD PRESSURE: 58 MMHG | SYSTOLIC BLOOD PRESSURE: 126 MMHG

## 2019-08-12 VITALS — DIASTOLIC BLOOD PRESSURE: 58 MMHG | SYSTOLIC BLOOD PRESSURE: 112 MMHG

## 2019-08-12 VITALS — SYSTOLIC BLOOD PRESSURE: 137 MMHG | DIASTOLIC BLOOD PRESSURE: 63 MMHG

## 2019-08-12 VITALS — DIASTOLIC BLOOD PRESSURE: 56 MMHG | SYSTOLIC BLOOD PRESSURE: 133 MMHG

## 2019-08-12 VITALS — SYSTOLIC BLOOD PRESSURE: 104 MMHG | DIASTOLIC BLOOD PRESSURE: 51 MMHG

## 2019-08-12 VITALS — SYSTOLIC BLOOD PRESSURE: 96 MMHG | DIASTOLIC BLOOD PRESSURE: 54 MMHG

## 2019-08-12 VITALS — DIASTOLIC BLOOD PRESSURE: 58 MMHG | SYSTOLIC BLOOD PRESSURE: 127 MMHG

## 2019-08-12 DIAGNOSIS — D72.829: ICD-10-CM

## 2019-08-12 DIAGNOSIS — R50.9: Primary | ICD-10-CM

## 2019-08-12 LAB
ALBUMIN SERPL BCG-MCNC: 1.6 GM/DL (ref 3.2–5.2)
ALT SERPL W P-5'-P-CCNC: 20 U/L (ref 12–78)
BASOPHILS # BLD AUTO: 0 10^3/UL (ref 0–0.2)
BASOPHILS NFR BLD AUTO: 0.2 % (ref 0–1)
BILIRUB SERPL-MCNC: 0.2 MG/DL (ref 0.2–1)
BUN SERPL-MCNC: 20 MG/DL (ref 7–18)
CA-I BLD-MCNC: 4.1 MG/DL (ref 4.5–5.3)
CALCIUM SERPL-MCNC: 8 MG/DL (ref 8.8–10.2)
CHLORIDE SERPL-SCNC: 105 MEQ/L (ref 98–107)
CHOLEST SERPL-MCNC: 97 MG/DL (ref ?–200)
CHOLEST/HDLC SERPL: 5.71 {RATIO} (ref ?–5)
CK MB CFR.DF SERPL CALC: 3.03
CK MB SERPL-MCNC: < 1 NG/ML (ref ?–3.6)
CK SERPL-CCNC: 33 U/L (ref 26–192)
CO2 SERPL-SCNC: 22 MEQ/L (ref 21–32)
CREAT SERPL-MCNC: 0.78 MG/DL (ref 0.55–1.3)
EOSINOPHIL # BLD AUTO: 0.1 10^3/UL (ref 0–0.5)
EOSINOPHIL NFR BLD AUTO: 0.6 % (ref 0–3)
FT4I SERPL CALC-MCNC: 2.8 % (ref 1.3–4.8)
GFR SERPL CREATININE-BSD FRML MDRD: > 60 ML/MIN/{1.73_M2} (ref 32–?)
GLUCOSE SERPL-MCNC: 81 MG/DL (ref 70–100)
HCT VFR BLD AUTO: 32.6 % (ref 36–47)
HDLC SERPL-MCNC: 17 MG/DL (ref 40–?)
HGB BLD-MCNC: 10.3 G/DL (ref 12–15.5)
LDLC SERPL CALC-MCNC: 44 MG/DL (ref ?–100)
LYMPHOCYTES # BLD AUTO: 0.8 10^3/UL (ref 1.5–4.5)
LYMPHOCYTES NFR BLD AUTO: 6.6 % (ref 24–44)
MAGNESIUM SERPL-MCNC: 2.3 MG/DL (ref 1.8–2.4)
MCH RBC QN AUTO: 35.2 PG (ref 27–33)
MCHC RBC AUTO-ENTMCNC: 31.6 G/DL (ref 32–36.5)
MCV RBC AUTO: 111.3 FL (ref 80–96)
MONOCYTES # BLD AUTO: 0.5 10^3/UL (ref 0–0.8)
MONOCYTES NFR BLD AUTO: 4 % (ref 0–5)
NEUTROPHILS # BLD AUTO: 10.8 10^3/UL (ref 1.8–7.7)
NEUTROPHILS NFR BLD AUTO: 87.6 % (ref 36–66)
NONHDLC SERPL-MCNC: 80 MG/DL
NT-PRO BNP: 2198 PG/ML (ref ?–450)
PLATELET # BLD AUTO: 395 10^3/UL (ref 150–450)
POTASSIUM SERPL-SCNC: 5 MEQ/L (ref 3.5–5.1)
PROT SERPL-MCNC: 5.1 GM/DL (ref 6.4–8.2)
RBC # BLD AUTO: 2.93 10^6/UL (ref 4–5.4)
SODIUM SERPL-SCNC: 138 MEQ/L (ref 136–145)
T3RU NFR SERPL: 29 % (ref 30–39)
T4 SERPL-MCNC: 9.8 UG/DL (ref 4.5–12)
TRIGL SERPL-MCNC: 179 MG/DL (ref ?–150)
TROPONIN I SERPL-MCNC: < 0.02 NG/ML (ref ?–0.1)
TSH SERPL DL<=0.005 MIU/L-ACNC: 7.29 UIU/ML (ref 0.36–3.74)
WBC # BLD AUTO: 12.4 10^3/UL (ref 4–10)

## 2019-08-12 RX ADMIN — TORSEMIDE SCH MG: 20 TABLET ORAL at 09:07

## 2019-08-12 RX ADMIN — POLYVINYL ALCOHOL SCH DROP: 14 SOLUTION/ DROPS OPHTHALMIC at 09:08

## 2019-08-12 RX ADMIN — DOCUSATE SODIUM SCH MG: 100 CAPSULE, LIQUID FILLED ORAL at 22:14

## 2019-08-12 RX ADMIN — FERROUS SULFATE TAB 325 MG (65 MG ELEMENTAL FE) SCH MG: 325 (65 FE) TAB at 09:07

## 2019-08-12 RX ADMIN — PRIMIDONE SCH MG: 50 TABLET ORAL at 09:06

## 2019-08-12 RX ADMIN — LORATADINE SCH MG: 10 TABLET ORAL at 09:06

## 2019-08-12 RX ADMIN — CEFTRIAXONE SCH MLS/HR: 1 INJECTION, POWDER, FOR SOLUTION INTRAMUSCULAR; INTRAVENOUS at 17:53

## 2019-08-12 RX ADMIN — CARBIDOPA AND LEVODOPA SCH TAB: 25; 100 TABLET ORAL at 09:06

## 2019-08-12 RX ADMIN — DIVALPROEX SODIUM SCH MG: 250 TABLET, DELAYED RELEASE ORAL at 22:12

## 2019-08-12 RX ADMIN — FOLIC ACID SCH MG: 1 TABLET ORAL at 22:14

## 2019-08-12 RX ADMIN — POTASSIUM CHLORIDE SCH MEQ: 750 TABLET, EXTENDED RELEASE ORAL at 09:07

## 2019-08-12 RX ADMIN — ESCITALOPRAM OXALATE SCH MG: 10 TABLET, FILM COATED ORAL at 22:11

## 2019-08-12 RX ADMIN — CARBIDOPA AND LEVODOPA SCH TAB: 25; 100 TABLET ORAL at 22:12

## 2019-08-12 RX ADMIN — TORSEMIDE SCH MG: 20 TABLET ORAL at 17:53

## 2019-08-12 RX ADMIN — POTASSIUM CHLORIDE SCH MEQ: 750 TABLET, EXTENDED RELEASE ORAL at 21:00

## 2019-08-12 RX ADMIN — DIVALPROEX SODIUM SCH MG: 250 TABLET, DELAYED RELEASE ORAL at 09:07

## 2019-08-12 RX ADMIN — SODIUM CHLORIDE, PRESERVATIVE FREE SCH ML: 5 INJECTION INTRAVENOUS at 22:14

## 2019-08-12 RX ADMIN — APIXABAN SCH MG: 5 TABLET, FILM COATED ORAL at 09:07

## 2019-08-12 RX ADMIN — CARBIDOPA AND LEVODOPA SCH TAB: 25; 100 TABLET ORAL at 13:00

## 2019-08-12 RX ADMIN — MULTIPLE VITAMINS W/ MINERALS TAB SCH TAB: TAB at 09:06

## 2019-08-12 RX ADMIN — ATORVASTATIN CALCIUM SCH MG: 10 TABLET, FILM COATED ORAL at 22:11

## 2019-08-12 RX ADMIN — POLYVINYL ALCOHOL SCH DROP: 14 SOLUTION/ DROPS OPHTHALMIC at 22:15

## 2019-08-12 RX ADMIN — METOPROLOL SUCCINATE SCH MG: 25 TABLET, EXTENDED RELEASE ORAL at 09:08

## 2019-08-12 RX ADMIN — DOCUSATE SODIUM SCH MG: 100 CAPSULE, LIQUID FILLED ORAL at 09:08

## 2019-08-12 RX ADMIN — PRIMIDONE SCH MG: 50 TABLET ORAL at 22:12

## 2019-08-12 RX ADMIN — CARBIDOPA AND LEVODOPA SCH TAB: 25; 100 TABLET ORAL at 17:53

## 2019-08-12 RX ADMIN — SODIUM CHLORIDE, PRESERVATIVE FREE SCH ML: 5 INJECTION INTRAVENOUS at 14:05

## 2019-08-12 RX ADMIN — SODIUM CHLORIDE SCH MLS/HR: 9 INJECTION, SOLUTION INTRAVENOUS at 06:13

## 2019-08-12 NOTE — ECGEPIP
[facility] - ED

                                       

                                       Test Date:    2019

Pat Name:     MINA VERA      Department:   

Patient ID:   [id number]                 Room:         -

Gender:       Female                   Technician:   YAZ

:          1930               Requested By: Ariel MONTERROSO

Order Number: [id number]     Reading MD:   Ariel Chery

                                 Measurements

Intervals                              Axis          

Rate:         141                      P:            

MS:           0                        QRS:          -56

QRSD:         79                       T:            75

QT:           286                                    

QTc:          439                                    

                           Interpretive Statements

ATRIAL FIBRILLATION WITH RAPID VENTRICULAR RESPONSE

PATTERN CONSISTENT WITH PULMONARY DISEASE

INFERIOR MYOCARDIAL INFARCTION, PROBABLY OLD

RHYTHM/RATE CHANGE COMPARED TO 18

Electronically Signed on 2019 5:45:00 EDT by Ariel Chery

## 2019-08-12 NOTE — CR
DATE OF CONSULTATION:  08/12/2019

 

CARDIOLOGY CONSULTATION

 

REFERRING PHYSICIAN:  Dr. Dayami George with copy to Dr. Aida Parra

 

INDICATION:  Chronic atrial fibrillation with intermittent high-grade AV block.

 

HISTORY:  This 89-year-old, , mother of three grown children, current

resident of [facility] here in [place], has been

followed by Dr. Parra for multiple medical problems including obesity,

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  She

has had cardiomegaly dating back to at least 2000 with echocardiogram February 2000 showing borderline left ventricular hypertrophy, slight global hypokinesis,

as well as slightly dilated aortic root and mild aortic insufficiency.  Right

heart chamber sizes appear to be normal without evidence of pulmonary

hypertension at that time.  She is status post deep venous thrombosis complicated

with pulmonary emboli.  Has had a history of paroxysmal atrial fibrillation

dating back to August 2010.  She has been on chronic oral anticoagulation.  Has

been on metoprolol and had intermittent episodes of bradycardia.  Has collapsed

May 2018 attributed to her Parkinson's disease and was diagnosed with heart

failure (diastolic dysfunction) dating back to January 2018.  Last EKG prior to

her current admission dated 05/22/2018 in the emergency room at the time of her

near syncope / syncope showed sinus rhythm with first-degree AV block and

occasional PVC.  Marked left axis with diffuse ST / T-wave abnormalities, could

not rule out a prior inferior infarction.  Appearance not significantly changed

from 12/06/2017, though she was in atrial fibrillation at that time, when an

echocardiogram performed 12/06/2017 showed normal left ventricular size,

borderline left ventricle hypertrophy and normal left ventricular wall motion.

 

She was transferred from [facility] to [facility]

08/11/2019 with some shortness of breath and documented with rapid ventricular

response despite metoprolol succinate therapy.  Initial electrocardiogram showed

a rate of 141 bpm.  Blood pressure was 133/69 with respiratory rate to 24 and

oxygen saturation 94%.  She had a low grade temperature.  She was given

intravenous digoxin 0.25 mg and her scheduled dose of metoprolol succinate 25 mg

by mouth and admitted to telemetry unit with a diagnosis of obstructive

pyelonephritis, ureterolithiasis, and atrial fibrillation with rapid ventricular

response.  Her last recorded pulse leaving the emergency room was 108 with blood

pressure 126/60.  On telemetry she continued to have somewhat rapid ventricular

responses between 110 and 120 bpm alternating with pauses of up to at least 3

seconds.  In light of this cardiology consultation was placed.

 

CARDINAL CARDIAC SYMPTOMS:  Despite her EKG appearance, the patient claims to

have never had problems with effort related chest pain.  Does have a history of

gastroesophageal reflux.  Had been a 40-year one-half pack per day smoker with

radiographic evidence of hyperinflated lung fields but her chief limitation the

past 5 years has been her gradually worsening Parkinson's disease.  Her weight at

age 18 was 130 pounds.  Her current weight is believed to be her maximum at 255

pounds.  She is presently bed and wheelchair bound.  She is actually unaware of

her cardiac enlargement, prior heart failure although she recognizes she has been

on a diuretic.  Claims to have never had any awareness of her heart action.

Prior lightheadedness and collapse attributed to her Parkinson's.  Has not had

any lateralizing neurological deficit, flank pain, or blue toe syndrome.  Unaware

of any prior heart murmur or rheumatic fever.  Longstanding venous insufficiency

with lower leg swelling as well as phlebitis and prior pulmonary embolism as

mentioned.  No history of claudication.

 

CORONARY RISK FACTORS:  Advanced age.  Obesity.  Former longstanding smoking

history.  Hyperlipidemia but no history of diabetes mellitus.  No family history

of premature coronary heart disease.  No history of hypertension.

 

OTHER PAST MEDICAL / SURGICAL HISTORY:  Longstanding obesity.  Parkinson's

disease.  Chronic venous insufficiency with prior deep venous thrombosis and

pulmonary embolism.  Degenerative joint disease with polymyalgia rheumatica.

Chronic renal insufficiency.  Status post resection of optic nerve meningioma.

Has macular degeneration.  Post hysterectomy and hip replacement.

 

REVIEW OF SYSTEMS:  She herself has not been aware of any fevers, chills or night

sweats.  Impaired vision, unable to read or even watch television.  Has upper

denture plate.  Thyroid did not appear to be enlarged.  No history of thyroid

dysfunction.  Occasional cough but with her limited activity does not have

dyspnea at rest or when lying in bed.  Currently has a good appetite with no

dysphagia, heartburn or change in bowel habit.  Unaware of prior GI bleeding.

Urinary incontinence.  No environmental allergies.  All other systems review is

negative.

 

MEDICATIONS:

At the time of her admission her medications included:

 - cefpodoxime 100 mg twice a day started for her urinary tract infection

- torsemide 60 mg twice a day

- KCl 20 mEq twice a day

- magnesium 400 mg daily

- Eliquis 5 mg twice a day

- atorvastatin 10 mg nightly

- metoprolol succinate 25 mg daily

- prednisone 2 mg daily

- Xopenex one inhalation four times a day as needed for shortness of breath

- loratadine 10 mg by mouth daily

- primidone 200 mg daily

- methotrexate 12.5 mg weekly for rheumatoid arthritis

- carbidopa / levodopa  1.5 tablets four times daily

- Depakote 25 mg twice a day

- escitalopram 10 mg nightly

- multivitamin one tablet daily

- Tylenol every 4 hours as needed for pain

- Colace 200 mg twice a day with as needed Fleet enema

- ferrous sulfate 325 mg daily

- folic acid 1 mg daily

 

ALLERGIES:  None known.

 

PHYSICAL EXAMINATION:

Constitutional:  Pleasant, obese elderly lady lying with the head of bed elevated

30 degrees.  Some speech difficulties because of her Parkinson's but was able to

relay a fairly good history.  Did not appear to be in any distress.

Vital signs:  Heart rate 102 beats per minute and irregular, blood pressure

112/58, respiratory rate 20, oxygen saturation 95% on room air.  Currently

afebrile.  Weight 255 pounds, height 68 inches, BMI 38.8.

Eyes:  Senile arcus but no conjunctival pallor, icterus or petechiae.  No

xanthelasma.

ENT / mouth:  Has upper dental bridge but otherwise has her own teeth.  Normal

oral moisture.  No central cyanosis.

Neck:  Trachea midline.  Neck veins were 2-3 cm above the sternal angle.

Respiratory:  Increased anteroposterior chest diameter with symmetrical air entry

over both lung fields.  Few bibasilar inspiratory crepitations that improved with

coughing.  Slight prolongation of expiration but no audible wheeze at this time.

Cardiovascular:  Apical impulse not palpable.  Heart sounds quite distant but

variable,  unable to detect S2 splitting.  No audible murmur or gallop.  No rub.

Normal carotid upstrokes with variable volume related to her arrhythmia, but no

bruits.  Upper extremity pulses were symmetrical and normal.  Unable to palpate

her abdominal aorta or femoral pulses because of morbid obesity with pannus.

Pedal pulses were palpable even through her swollen feet and ankles.  Has pitting

and nonpitting swelling of both lower extremities.  Few dilated superficial

venules.

Extremities:  No clubbing, peripheral cyanosis or splinter hemorrhages.

GI:  Obese, soft, nontender with obvious lower abdominal pannus.  Unable to

detect hepatosplenomegaly.  Normal bowel sounds.  Rectal examination not

indicated.

Musculoskeletal:  Has a history of rheumatoid arthritis but has no detectable

nodules or obvious rheumatoid deformities.  Has proximal muscle weakness with

cogwheel rigidity.

Neurologic / psychiatric:  Bright, alert and was able to provide a meaningful

history.  Eye movements were conjugated.  Pupils were slightly asymmetrical.

Facial and extremity movements were symmetrical but limited.  No current abnormal

movements.  Affect was appropriate.

Skin:  No rashes, ecchymotic lesions, pallor or icterus.

 

INVESTIGATIONS:

Portable upright chest x-ray was reviewed independently from 08/11/2019 taken in

the emergency room, even allowing for this technique she has obvious

cardiomegaly.  Her thoracic aorta was slightly unfolded with some calcification

of the aortic arch.  Pulmonary vasculature appeared to be accentuated possibly

partially related to her obesity.  Has no localized infiltrate or pleural

effusion.

 

EKGs:  08/11/2019 at 1149 hours showed underlying atrial fibrillation with

somewhat rapid ventricular response averaging 141 bpm.  Low voltages with poor

precordial R-wave progression and persistent S waves V5-V6 and QS pattern in

inferior leads, possibly in keeping with body habitus and her known pulmonary

disease.  Marked left axis in keeping with left anterior hemiblock.  Lateral ST /

T-wave abnormalities not dramatically changed from the past despite the faster

heart rate.

 

TELEMETRY MONITOR:  This has shown consistent atrial fibrillation with rates

initially 140, then 120, and today at 1048 hours intermittent pauses of 3

seconds.

 

CT scan of the abdomen and pelvis without contrast for suspected pyelonephritis /

sepsis showed subsegmental atelectatic changes of both the bases of her lungs,

could not rule out small pleural effusion.  The liver and spleen showed no

apparent abnormalities neither did her gallbladder, pancreas and adrenal glands.

She had multiple kidney stones on both sides with mild hydropelvis on the right

side.  Some calcific atherosclerotic change of the abdominal aorta.  No evidence

of ascites or free air.  Bilateral pelvic phleboliths, osteoporosis and chronic

changes of her spine and left hip with right hip prosthesis.

 

CT scan of the head without contrast 08/11/2019 showed post-procedural large area

of encephalomalacia right lobe and anterior right temporal lobe.  Scattered small

vessel ischemic disease.  No evidence of acute intracranial hemorrhagic or

nonhemorrhagic event.

 

IMPRESSION / PLAN:

1.  AV block (unspecified):  Unfortunately requiring negative chronotropic

therapy to control atrial fibrillation with rapid ventricular response.  She has

had episodes of high-grade AV block.  As discussed with the patient and her

daughter Carolynn, we have suggested a permanent pacemaker be inserted to allow

safe administration of her negative chronotropic therapy for rate control of her

chronic atrial fibrillation.  The indication, procedure and potential risks were

discussed.  They appeared to understand and agree.  Her oral anticoagulation has

been placed on hold temporarily with plans to proceed with single chamber

ventricular demand pacemaker under monitored local anesthesia tomorrow morning.

 

2.  Chronic atrial fibrillation:  Despite the beta-blocker therapy, EKG and

telemetry monitor on admission shows a rapid ventricular response.  Undoubtedly

this rapid ventricular response would lead to decompensation of her heart

failure, mandating negative chronotropic therapy.  Permanent pacemaker implant as

suggested to allow safe administration of these agents.  Prior echocardiograms

have been technically difficult because her body habitus.  Prior chest CT scan

showed at least a mildly dilated left atrium, normal left ventricular size,

borderline left ventricle hypertrophy, slightly dilated right ventricle and

pulmonary trunk in keeping with a degree of left ventricular diastolic

dysfunction and secondary pulmonary hypertension.  I suspect this is on the basis

of her longstanding obesity with pulmonary arterial pressure being affected by

prior pulmonary emboli.  The right atrium was at least moderately increased in

size.  This atrial dilatation obviously predisposed her to atrial

tachyarrhythmias.  She has had this arrhythmia for some duration so we will not

attempt to convert her to a sinus mechanism.

 

3.  Heart failure (diastolic / acute on chronic):  Recent shortness of breath and

her current elevated proBNP level likely on the basis of her rapid ventricular

response to atrial fibrillation and abbreviated left ventricular filling time.

Optimal heart rate control is necessary to minimize left atrial pressure.

Remains on a modest salt and fluid intake restriction.  Also on torsemide 60 mg

twice a day with KCl.  Currently appears compensated.

 

4.  Abnormal EKG:  Findings in keeping with her body habitus and pulmonary

disease.  Left anterior hemiblock reflection of degenerative conduction tissue

disease as is her first-degree AV block at the time she was in sinus rhythm.

Could not rule out a prior inferior infarction but previous echocardiograms have

failed to demonstrate localized wall motion abnormality despite her multiple risk

factors.  Repolarization abnormalities are chronic.  Serial cardiac enzymes have

been negative.  Remains on protective combination metoprolol succinate,

atorvastatin and oral anticoagulation (temporarily on hold for her pacemaker

implant).

 

5. 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

undoubtedly her longstanding obesity has led to her left ventricle hypertrophy,

left ventricular diastolic dysfunction and left atrial enlargement as well as

contributing to her pulmonary hypertension.  Has never had hypertension and no

echocardiographic evidence of significant valvular heart disease.  Crucial

management measure to prevent worsening would ideally be significant weight loss.

Unfortunately with her neurological and orthopedic problems and inability to

ambulate this will be exceedingly difficult.

 

I will plan on following her closely with you for the time being and appreciate

the opportunity to participate in her care.  Best regards.

## 2019-08-12 NOTE — IPN
DATE: 08/12/2019

 

This morning, patient remains tachycardic but sinus. She denies any chest pain,

pressure or tightness, lightheadedness, dizziness or palpitations. She is

chronically wheelchair-bound and bed-bound, does not ambulate. No complaints of

shortness of breath. Laboratories are still pending. Denies any dysuria, 
urgency,

frequency, fevers, chills, or flank pain overnight.

 

VITAL SIGNS: Temperature 96.9, pulse 94 sinus, respiratory rate 18, blood

pressure 127/58, 94% on room air.

GENERAL: Patient is awake, alert and oriented to person only. She is pleasantly

demented but answers questions appropriately. She has no conversational dyspnea.

No use of respiratory accessory muscles. Able to speak in full sentences.

Face is symmetric. Dry mucous membranes which chapped lips. Poor dentition.

LUNGS: Diminished but clear to auscultation. No wheezes, rales, or rhonchi.

HEART: S1, S2, sinus tachycardia.

ABDOMEN: Soft, obese, nontender, nondistended.

EXTREMITIES: Chronic edema.

 

LABORATORY DATA: Unavailable at this time.

 

ASSESSMENT AND PLAN: This is an 89-year-old female with a five-day history of

worsening dyspnea, poor appetite and confusion. She was found to have a urinary

tract infection and atrial fibrillation with rapid ventricular response (RVR).

 

ACTIVE ISSUES:

1. Atrial fibrillation with a rapid ventricular response. Currently on

metoprolol, rate controlled. She did receive digoxin and currently on

anticoagulation for cerebrovascular accident (CVA) prevention.

 

2. Urinary tract infection with prior history of Proteus mirabilis in urine

culture, on ceftriaxone. CT shows bilateral renal calculi with mild right-sided

hydronephrosis. We will need obtain urology consultation. Creatinine is within

normal limits. Patient is urinating well. She has no complaints of dysuria or

hematuria.

 

3. Acute metabolic encephalopathy secondary to urinary tract infection (UTI) and

atrial fibrillation with rapid ventricular response (RVR). CT of the head shows

no acute CVA, chronic brain changes, most likely post residual. No acute

intracranial hemorrhage or nonhemorrhagic event on this CT.

 

4. Obesity, body mass index (BMI) of 38.8, at risk for hypercarbia. Monitor for

further mental status changes.

 

5. Parkinson's disease, polymyalgia rheumatica and rheumatoid arthritis. Patient

is Hilda lift at baseline. She is on a modified diet for aspiration risk.

FRITZ

## 2019-08-13 VITALS — SYSTOLIC BLOOD PRESSURE: 127 MMHG | DIASTOLIC BLOOD PRESSURE: 58 MMHG

## 2019-08-13 VITALS — DIASTOLIC BLOOD PRESSURE: 58 MMHG | SYSTOLIC BLOOD PRESSURE: 123 MMHG

## 2019-08-13 VITALS — SYSTOLIC BLOOD PRESSURE: 122 MMHG | DIASTOLIC BLOOD PRESSURE: 61 MMHG

## 2019-08-13 VITALS — DIASTOLIC BLOOD PRESSURE: 59 MMHG | SYSTOLIC BLOOD PRESSURE: 119 MMHG

## 2019-08-13 VITALS — DIASTOLIC BLOOD PRESSURE: 60 MMHG | SYSTOLIC BLOOD PRESSURE: 123 MMHG

## 2019-08-13 VITALS — SYSTOLIC BLOOD PRESSURE: 137 MMHG | DIASTOLIC BLOOD PRESSURE: 77 MMHG

## 2019-08-13 VITALS — DIASTOLIC BLOOD PRESSURE: 55 MMHG | SYSTOLIC BLOOD PRESSURE: 116 MMHG

## 2019-08-13 VITALS — SYSTOLIC BLOOD PRESSURE: 123 MMHG | DIASTOLIC BLOOD PRESSURE: 59 MMHG

## 2019-08-13 VITALS — DIASTOLIC BLOOD PRESSURE: 53 MMHG | SYSTOLIC BLOOD PRESSURE: 115 MMHG

## 2019-08-13 VITALS — DIASTOLIC BLOOD PRESSURE: 60 MMHG | SYSTOLIC BLOOD PRESSURE: 127 MMHG

## 2019-08-13 LAB
BASOPHILS # BLD AUTO: 0 10^3/UL (ref 0–0.2)
BASOPHILS NFR BLD AUTO: 0.2 % (ref 0–1)
BUN SERPL-MCNC: 19 MG/DL (ref 7–18)
CALCIUM SERPL-MCNC: 8.1 MG/DL (ref 8.8–10.2)
CHLORIDE SERPL-SCNC: 103 MEQ/L (ref 98–107)
CO2 SERPL-SCNC: 25 MEQ/L (ref 21–32)
CREAT SERPL-MCNC: 0.8 MG/DL (ref 0.55–1.3)
EOSINOPHIL # BLD AUTO: 0.1 10^3/UL (ref 0–0.5)
EOSINOPHIL NFR BLD AUTO: 0.9 % (ref 0–3)
GFR SERPL CREATININE-BSD FRML MDRD: > 60 ML/MIN/{1.73_M2} (ref 32–?)
GLUCOSE SERPL-MCNC: 84 MG/DL (ref 70–100)
HCT VFR BLD AUTO: 31.2 % (ref 36–47)
HGB BLD-MCNC: 10.2 G/DL (ref 12–15.5)
LYMPHOCYTES # BLD AUTO: 1.4 10^3/UL (ref 1.5–4.5)
LYMPHOCYTES NFR BLD AUTO: 9.5 % (ref 24–44)
MCH RBC QN AUTO: 34.7 PG (ref 27–33)
MCHC RBC AUTO-ENTMCNC: 32.7 G/DL (ref 32–36.5)
MCV RBC AUTO: 106.1 FL (ref 80–96)
MONOCYTES # BLD AUTO: 0.9 10^3/UL (ref 0–0.8)
MONOCYTES NFR BLD AUTO: 6.1 % (ref 0–5)
NEUTROPHILS # BLD AUTO: 11.9 10^3/UL (ref 1.8–7.7)
NEUTROPHILS NFR BLD AUTO: 82.1 % (ref 36–66)
PLATELET # BLD AUTO: 375 10^3/UL (ref 150–450)
POTASSIUM SERPL-SCNC: 4 MEQ/L (ref 3.5–5.1)
RBC # BLD AUTO: 2.94 10^6/UL (ref 4–5.4)
SODIUM SERPL-SCNC: 139 MEQ/L (ref 136–145)
WBC # BLD AUTO: 14.5 10^3/UL (ref 4–10)

## 2019-08-13 PROCEDURE — 0JH606Z INSERTION OF PACEMAKER, DUAL CHAMBER INTO CHEST SUBCUTANEOUS TISSUE AND FASCIA, OPEN APPROACH: ICD-10-PCS | Performed by: INTERNAL MEDICINE

## 2019-08-13 PROCEDURE — 02H63JZ INSERTION OF PACEMAKER LEAD INTO RIGHT ATRIUM, PERCUTANEOUS APPROACH: ICD-10-PCS | Performed by: INTERNAL MEDICINE

## 2019-08-13 PROCEDURE — 02HK3JZ INSERTION OF PACEMAKER LEAD INTO RIGHT VENTRICLE, PERCUTANEOUS APPROACH: ICD-10-PCS | Performed by: INTERNAL MEDICINE

## 2019-08-13 RX ADMIN — DIVALPROEX SODIUM SCH MG: 250 TABLET, DELAYED RELEASE ORAL at 21:11

## 2019-08-13 RX ADMIN — POLYVINYL ALCOHOL SCH DROP: 14 SOLUTION/ DROPS OPHTHALMIC at 21:12

## 2019-08-13 RX ADMIN — LORATADINE SCH MG: 10 TABLET ORAL at 08:46

## 2019-08-13 RX ADMIN — CARBIDOPA AND LEVODOPA SCH TAB: 25; 100 TABLET ORAL at 21:11

## 2019-08-13 RX ADMIN — CARBIDOPA AND LEVODOPA SCH TAB: 25; 100 TABLET ORAL at 13:52

## 2019-08-13 RX ADMIN — SPIRONOLACTONE SCH MG: 25 TABLET, FILM COATED ORAL at 13:51

## 2019-08-13 RX ADMIN — SODIUM CHLORIDE, PRESERVATIVE FREE SCH ML: 5 INJECTION INTRAVENOUS at 05:39

## 2019-08-13 RX ADMIN — SODIUM CHLORIDE, PRESERVATIVE FREE SCH ML: 5 INJECTION INTRAVENOUS at 21:11

## 2019-08-13 RX ADMIN — SODIUM CHLORIDE, PRESERVATIVE FREE SCH ML: 5 INJECTION INTRAVENOUS at 13:52

## 2019-08-13 RX ADMIN — DIVALPROEX SODIUM SCH MG: 250 TABLET, DELAYED RELEASE ORAL at 08:50

## 2019-08-13 RX ADMIN — CARBIDOPA AND LEVODOPA SCH TAB: 25; 100 TABLET ORAL at 08:47

## 2019-08-13 RX ADMIN — FLECAINIDE ACETATE SCH MG: 50 TABLET ORAL at 13:51

## 2019-08-13 RX ADMIN — DOCUSATE SODIUM SCH MG: 100 CAPSULE, LIQUID FILLED ORAL at 09:06

## 2019-08-13 RX ADMIN — PRIMIDONE SCH MG: 50 TABLET ORAL at 08:45

## 2019-08-13 RX ADMIN — MULTIPLE VITAMINS W/ MINERALS TAB SCH TAB: TAB at 08:46

## 2019-08-13 RX ADMIN — POTASSIUM CHLORIDE SCH MEQ: 750 TABLET, EXTENDED RELEASE ORAL at 08:46

## 2019-08-13 RX ADMIN — PRIMIDONE SCH MG: 50 TABLET ORAL at 21:11

## 2019-08-13 RX ADMIN — CEFTRIAXONE SCH MLS/HR: 1 INJECTION, POWDER, FOR SOLUTION INTRAMUSCULAR; INTRAVENOUS at 16:50

## 2019-08-13 RX ADMIN — METOPROLOL SUCCINATE SCH MG: 25 TABLET, EXTENDED RELEASE ORAL at 08:47

## 2019-08-13 RX ADMIN — FOLIC ACID SCH MG: 1 TABLET ORAL at 21:11

## 2019-08-13 RX ADMIN — POLYVINYL ALCOHOL SCH DROP: 14 SOLUTION/ DROPS OPHTHALMIC at 08:48

## 2019-08-13 RX ADMIN — FLECAINIDE ACETATE SCH MG: 50 TABLET ORAL at 21:11

## 2019-08-13 RX ADMIN — ESCITALOPRAM OXALATE SCH MG: 10 TABLET, FILM COATED ORAL at 21:11

## 2019-08-13 RX ADMIN — FERROUS SULFATE TAB 325 MG (65 MG ELEMENTAL FE) SCH MG: 325 (65 FE) TAB at 08:46

## 2019-08-13 RX ADMIN — CARBIDOPA AND LEVODOPA SCH TAB: 25; 100 TABLET ORAL at 16:49

## 2019-08-13 RX ADMIN — ATORVASTATIN CALCIUM SCH MG: 10 TABLET, FILM COATED ORAL at 21:11

## 2019-08-13 RX ADMIN — ACETAMINOPHEN PRN MG: 325 TABLET ORAL at 18:25

## 2019-08-13 RX ADMIN — DOCUSATE SODIUM SCH MG: 100 CAPSULE, LIQUID FILLED ORAL at 21:10

## 2019-08-13 NOTE — IPNPDOC
Subjective


Date Seen


The patient was seen on 8/13/19.





Subjective


Chief Complaint/HPI


Feels comfortable this morning. Says her breathing is better. Says she is WC 

bound and cannot  her legs much but able to raise her arms and hold them. 

denied any focal weakness. She is planned for a PPM today.





Objective


Physical Examination


General Exam:  Positive: Alert, Cooperative, No Acute Distress, Other (the 

patient was minimally responsive, and so we started talking about food that she 

likes)


Eye Exam:  Positive: PERRLA, Conjunctiva & lids normal


ENT Exam:  Positive: Atraumatic, Other ENT (dry oral mucosa)


Neck Exam:  Positive: Supple


Chest Exam:  Positive: Clear to auscultation, Normal air movement


Heart Exam:  Positive: Irregular Rhythm, Normal S1, Normal S2; 


   Negative: Gallops, Murmurs, Rubs


Telemetry:  Positive: Atrial fibrillation


Abdomen Exam:  Positive: Normal bowel sounds, Soft, Other (central obesity); 


   Negative: Tenderness, Hepatospenomegaly


Extremity Exam:  Positive: Edema, Normal pulses, Swelling; 


   Negative: Clubbing, Cyanosis


Skin Exam:  Positive: Nl turgor and temperature, Other skin issue (patient has a

number of skin tags around her neck); 


   Negative: Breakdown, Lesion


Neuro Exam:  Positive: Cranial Nerves 3-12 NL


Psych Exam:  Positive: Other (oriented to self and family)





Assessment /Plan


Assessment


This is an 89-year-old female with dementia, bed bound , hilda lift from [facility] 

presented  with a five-day history of worsening dyspnea, poor appetite and 

confusion. She was found to have a urinary tract infection and atrial 

fibrillation with rapid ventricular response (RVR).





Cardiac arrhythmia


intermittent high degree A-V block. 


paroxysmal atrial fib, on metoprolol with intermittent bradycardia and a 

collapse in 2018, first degree heart block. 


sinus pauses in telemetry now.


planned fro PPM 


 


Paroxysmal Atrial fibrillation with a rapid ventricular response. Currently on


metoprolol, rate controlled. She did receive digoxin and currently on


anticoagulation for cerebrovascular accident (CVA) prevention.


 


Urinary tract infection with prior history of Proteus mirabilis in urine


culture, on ceftriaxone. CT shows bilateral renal calculi with mild right-sided


hydronephrosis. We will need obtain urology consultation. Creatinine is within


normal limits. Patient is urinating well. She has no complaints of dysuria or


hematuria.


 


Acute metabolic encephalopathy secondary to urinary tract infection (UTI) and


atrial fibrillation with rapid ventricular response (RVR). CT of the head shows


no acute CVA, chronic brain changes with encephalomalacia.  She does have 

history of brain tumor surgery in the remote past.  No acute


intracranial hemorrhage or nonhemorrhagic event on this CT.


 


Obesity, body mass index (BMI) of 38.8, at risk for hypercarbia. Monitor for


further mental status changes.


 


Parkinson's disease, polymyalgia rheumatica and rheumatoid arthritis. Patient


is Hilda lift at baseline. She is on a modified diet for aspiration risk.


continue prednisone, sinemet





Hyperlipidemia


continue statin. 





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  





Diastolic CHF


acute on chronic due to Afib with rvr. 


echocardiogram February 2000 showing borderline left ventricular hypertrophy, 

slight global hypokinesis,


as well as slightly dilated aortic root and mild aortic insufficiency.  Right 

heart chamber sizes appear to be normal without evidence of pulmonary


hypertension at that time. Diastolic dysfunction. 





Status post deep venous thrombosis complicated with pulmonary emboli. 


She has been on chronic oral anticoagulation.  





Hypertrophic cardiomyopathy 


probably longstanding obesity has led to her left ventricle hypertrophy,


left ventricular diastolic dysfunction and left atrial enlargement as well as


contributing to her pulmonary hypertension.





Plan/VTE


VTE Prophylaxis Ordered?:  Yes (the patient is chronically on Eliquis)





Plan


IVF:  Initiate


Diet:  Continue Current (requires ground texture)


Activity:  Bedrest (mobilize by Hilda lift to wheelchair)


Medications:  Bowel Regimen, Start Antibiotics


Diagnostics:  Repeat Labs in AM, Obtain Cultures


Anticipated Discharge:  Nursing Home





VS, I&O, 24H, [facility]


Vital Signs/I&O





Vital Signs








  Date Time  Temp Pulse Resp B/P (MAP) Pulse Ox O2 Delivery O2 Flow Rate FiO2


 


8/13/19 08:47  92  127/60    


 


8/13/19 07:42 98.4  20  97   


 


8/11/19 17:34      Room Air  














I&O- Last 24 Hours up to 6 AM 


 


 8/13/19





 06:00


 


Intake Total 1200 ml


 


Output Total 425 ml


 


Balance 775 ml











Laboratory Data


24H LABS


Laboratory Tests 2


8/13/19 07:25: 


Immature Granulocyte % (Auto) 1.2, White Blood Count 14.5H, Red Blood Count 

2.94L, Hemoglobin 10.2L, Hematocrit 31.2L, Mean Corpuscular Volume 106.1H, Mean 

Corpuscular Hemoglobin 34.7H, Mean Corpuscular Hemoglobin Concent 32.7, Red Cell

Distribution Width 16.8H, Platelet Count 375, Neutrophils (%) (Auto) 82.1H, 

Lymphocytes (%) (Auto) 9.5L, Monocytes (%) (Auto) 6.1H, Eosinophils (%) (Auto) 

0.9, Basophils (%) (Auto) 0.2, Neutrophils # (Auto) 11.9H, Lymphocytes # (Auto) 

1.4L, Monocytes # (Auto) 0.9H, Eosinophils # (Auto) 0.1, Basophils # (Auto) 0.0,

Nucleated Red Blood Cells % (auto) 0.0, Anion Gap 11, Glomerular Filtration Rate

> 60.0, Blood Urea Nitrogen 19H, Creatinine 0.80, Sodium Level 139, Potassium 

Level 4.0, Chloride Level 103, Carbon Dioxide Level 25, Calcium Level 8.1L


CBC/BMP


Laboratory Tests


8/13/19 07:25








Red Blood Count 2.94 L, Mean Corpuscular Volume 106.1 H, Mean Corpuscular 

Hemoglobin 34.7 H, Mean Corpuscular Hemoglobin Concent 32.7, Red Cell 

Distribution Width 16.8 H, Neutrophils (%) (Auto) 82.1 H, Lymphocytes (%) (Auto)

9.5 L, Monocytes (%) (Auto) 6.1 H, Eosinophils (%) (Auto) 0.9, Basophils (%) 

(Auto) 0.2, Neutrophils # (Auto) 11.9 H, Lymphocytes # (Auto) 1.4 L, Monocytes #

(Auto) 0.9 H, Eosinophils # (Auto) 0.1, Basophils # (Auto) 0.0, Calcium Level 

8.1 L


Microbiology





Microbiology


8/11/19 Blood Culture - Preliminary, Resulted


          No growth after 24 hours . All specim...


8/11/19 Blood Culture - Preliminary, Resulted


          No growth after 24 hours . All specim...











JULIANN MOTA MD                   Aug 13, 2019 11:25

## 2019-08-13 NOTE — RO
IMPLANTATION OF PERMANENT DUAL-CHAMBER PACEMAKER:

 

DATE OF PROCEDURE:  08/13/2019

 

IMPLANTING CARDIOLOGIST:  Dr. Francesco Ceballos

 

ANESTHESIOLOGIST:  Dr. Cook.

 

PREOPERATIVE DIAGNOSES:

1.  Intermittent high-grade atrioventricular (AV) block.

2.  Paroxysmal atrial fibrillation.

 

POSTOPERATIVE DIAGNOSES:

1.  Intermittent high-grade atrioventricular block.

2.  Paroxysmal atrial fibrillation.

 

TYPE OF ANESTHESIA:  Monitored local anesthesia.

 

DESCRIPTION OF PROCEDURE:  In the fasting state having signed informed consent

the patient was taken to the operating theater.  Numerous skin electrodes were

applied to facilitate continuous electrocardiographic monitoring.  Curiously,

though we had suspected she was in chronic atrial fibrillation.  The EKG monitor

at the time we go into the operating room showed a regular sinus rhythm with

marked first-degree AV block.  At this point, instead of inserting a

single-chamber ventricular demand pacemaker a dual-chamber system was performed.

 

The left subclavian region was prepped and draped in usual fashion.  The skin was

infiltrated with 1% Xylocaine and the left axillary vein was catheterized using

the micropuncture technique.  A 5 cm linear incision was made several centimeters

below and parallel to left clavicle.  Dissection was then carried down to the

pectoralis fascia and a pocket was fashioned below the level of the incision

line.  Two bipolar screw-in active fixation steroid eluting pacing leads were

then positioned to the right ventricular apex and high right atrial appendage

under fluoroscopic and electrocardiographic control.

 

The ventricular lead ([employer] model number [id number], serial number

[id number]) measurements were:  Stimulation threshold 1.0, V/0.4 ms/impedance 748

ohms.  The R wave amplitude measured 6.6 mV.

 

The atrial lead ([employer] model number [id number]/52, serial number

[id number]) measurements were:  Stimulation threshold 1.3 V/0.4 maximum/impedance

608 ohms.  The P wave amplitude measured 3.4 mV.

 

These leads were secured in position with sleeves sutured at their insertion

site.  They were then connected to a MRI compatible dual-chamber pulse generator

([employer] [facility] model number [id number], serial number [id number]) and

appropriate DDD pacing was documented.

 

The device was placed in the pocket and secured in position with a suture through

the right upper hand corner of the epoxy header.  The subcutaneous tissues were

approximated using a running chromic suture and the skin was closed using

staples.  A dry dressing was applied and the patient was returned to the recovery

room in good condition.

 

No apparent complications.  Estimated blood loss less than 10 mL. Portable

upright chest x-ray postoperatively showed appropriate lead position without

pneumothorax.  Her EKG showed sinus rhythm at 89 bpm with marked first-degree AV

block and isolated PVC.  With VIP mode activated at this time she has her narrow

spontaneous QRS complexes.  I anticipate with the introduction of antiarrhythmic

therapy necessary to control her atrial fibrillation with rapid ventricular

response she will start pacing consistently.  Her anticoagulation Eliquis will be

resumed 08/15/2019.

## 2019-08-13 NOTE — REP
REASON: Status-post pacemaker insertion.

 

COMPARISON: 08/11/2019

 

Since the last examination a dual chamber bipolar pacemaker device has been

placed. The leads are contiguous and appropriate. There is cardiomegaly

accentuated by technique. There is chronic changes seen involving the lung fields

seen status quo. No acute patchy parenchymal opacities or pleural effusions have

developed. There is unchanged bilateral apical pleural capping. This has been

stable from at least as far back as 05/22/2018.

 

IMPRESSION:

 

Chronic changes as described above.

 

 

Electronically Signed by

Barber Bryant DO 08/14/2019 09:32 A

## 2019-08-13 NOTE — ECGEPIP
[facility]

                                       

                                       Test Date:    2019

Pat Name:     MINA VERA      Department:   

Patient ID:   [id number]                 Room:         [place] [address]

Gender:       Female                   Technician:   EMIGDIO

:          1930               Requested By: Francesco Ceballos 

Order Number: [id number]     Reading MD:   Ashok Florence

                                 Measurements

Intervals                              Axis          

Rate:         89                       P:            107

MS:           255                      QRS:          -36

QRSD:         92                       T:            1

QT:           363                                    

QTc:          442                                    

                           Interpretive Statements

ELECTRONIC ATRIAL PACEMAKER

MARKED LEFT AXIS DEVIATION

LOW QRS VOLTAGE IN PRECORDIAL LEADS

MINIMAL ST DEPRESSION

MOST RECENT TRACING ON 2019 AT 11:42 A.M., ATRIAL FIBRILLATION WITH A

RAPID 

VENTRICULAR RATE WAS NOTED

Electronically Signed on 2019 20:58:12 EDT by Ashok Florence

## 2019-08-14 VITALS — DIASTOLIC BLOOD PRESSURE: 60 MMHG | SYSTOLIC BLOOD PRESSURE: 127 MMHG

## 2019-08-14 VITALS — DIASTOLIC BLOOD PRESSURE: 62 MMHG | SYSTOLIC BLOOD PRESSURE: 129 MMHG

## 2019-08-14 VITALS — SYSTOLIC BLOOD PRESSURE: 134 MMHG | DIASTOLIC BLOOD PRESSURE: 61 MMHG

## 2019-08-14 VITALS — DIASTOLIC BLOOD PRESSURE: 55 MMHG | SYSTOLIC BLOOD PRESSURE: 111 MMHG

## 2019-08-14 VITALS — SYSTOLIC BLOOD PRESSURE: 114 MMHG | DIASTOLIC BLOOD PRESSURE: 90 MMHG

## 2019-08-14 LAB
ALBUMIN SERPL BCG-MCNC: 1.4 GM/DL (ref 3.2–5.2)
ALT SERPL W P-5'-P-CCNC: 14 U/L (ref 12–78)
BILIRUB SERPL-MCNC: 0.3 MG/DL (ref 0.2–1)
BUN SERPL-MCNC: 21 MG/DL (ref 7–18)
CALCIUM SERPL-MCNC: 8.5 MG/DL (ref 8.8–10.2)
CHLORIDE SERPL-SCNC: 103 MEQ/L (ref 98–107)
CO2 SERPL-SCNC: 23 MEQ/L (ref 21–32)
CREAT SERPL-MCNC: 0.72 MG/DL (ref 0.55–1.3)
GFR SERPL CREATININE-BSD FRML MDRD: > 60 ML/MIN/{1.73_M2} (ref 32–?)
GLUCOSE SERPL-MCNC: 96 MG/DL (ref 70–100)
HCT VFR BLD AUTO: 26.9 % (ref 36–47)
HCT VFR BLD AUTO: 32.7 % (ref 36–47)
HGB BLD-MCNC: 10.6 G/DL (ref 12–15.5)
HGB BLD-MCNC: 8.6 G/DL (ref 12–15.5)
MCH RBC QN AUTO: 34.4 PG (ref 27–33)
MCH RBC QN AUTO: 35.7 PG (ref 27–33)
MCHC RBC AUTO-ENTMCNC: 32 G/DL (ref 32–36.5)
MCHC RBC AUTO-ENTMCNC: 32.4 G/DL (ref 32–36.5)
MCV RBC AUTO: 107.6 FL (ref 80–96)
MCV RBC AUTO: 110.1 FL (ref 80–96)
PLATELET # BLD AUTO: 354 10^3/UL (ref 150–450)
PLATELET # BLD AUTO: 384 10^3/UL (ref 150–450)
POTASSIUM SERPL-SCNC: 4.4 MEQ/L (ref 3.5–5.1)
PROT SERPL-MCNC: 5.8 GM/DL (ref 6.4–8.2)
RBC # BLD AUTO: 2.5 10^6/UL (ref 4–5.4)
RBC # BLD AUTO: 2.97 10^6/UL (ref 4–5.4)
SODIUM SERPL-SCNC: 136 MEQ/L (ref 136–145)
TROPONIN I SERPL-MCNC: < 0.02 NG/ML (ref ?–0.1)
WBC # BLD AUTO: 12.8 10^3/UL (ref 4–10)
WBC # BLD AUTO: 13 10^3/UL (ref 4–10)

## 2019-08-14 RX ADMIN — ESCITALOPRAM OXALATE SCH MG: 10 TABLET, FILM COATED ORAL at 21:31

## 2019-08-14 RX ADMIN — CARBIDOPA AND LEVODOPA SCH TAB: 25; 100 TABLET ORAL at 16:37

## 2019-08-14 RX ADMIN — TORSEMIDE SCH MG: 20 TABLET ORAL at 16:37

## 2019-08-14 RX ADMIN — FERROUS SULFATE TAB 325 MG (65 MG ELEMENTAL FE) SCH MG: 325 (65 FE) TAB at 08:50

## 2019-08-14 RX ADMIN — SODIUM CHLORIDE, PRESERVATIVE FREE SCH ML: 5 INJECTION INTRAVENOUS at 05:10

## 2019-08-14 RX ADMIN — SODIUM CHLORIDE, PRESERVATIVE FREE SCH ML: 5 INJECTION INTRAVENOUS at 21:34

## 2019-08-14 RX ADMIN — LORATADINE SCH MG: 10 TABLET ORAL at 08:51

## 2019-08-14 RX ADMIN — TORSEMIDE SCH MG: 20 TABLET ORAL at 08:49

## 2019-08-14 RX ADMIN — POLYVINYL ALCOHOL SCH DROP: 14 SOLUTION/ DROPS OPHTHALMIC at 21:34

## 2019-08-14 RX ADMIN — CEFTRIAXONE SCH MLS/HR: 1 INJECTION, POWDER, FOR SOLUTION INTRAMUSCULAR; INTRAVENOUS at 16:37

## 2019-08-14 RX ADMIN — CARBIDOPA AND LEVODOPA SCH TAB: 25; 100 TABLET ORAL at 14:04

## 2019-08-14 RX ADMIN — PRIMIDONE SCH MG: 50 TABLET ORAL at 08:51

## 2019-08-14 RX ADMIN — SODIUM CHLORIDE, PRESERVATIVE FREE SCH ML: 5 INJECTION INTRAVENOUS at 13:11

## 2019-08-14 RX ADMIN — DOCUSATE SODIUM SCH MG: 100 CAPSULE, LIQUID FILLED ORAL at 08:50

## 2019-08-14 RX ADMIN — CARBIDOPA AND LEVODOPA SCH TAB: 25; 100 TABLET ORAL at 08:51

## 2019-08-14 RX ADMIN — DIVALPROEX SODIUM SCH MG: 250 TABLET, DELAYED RELEASE ORAL at 21:30

## 2019-08-14 RX ADMIN — CARBIDOPA AND LEVODOPA SCH TAB: 25; 100 TABLET ORAL at 21:32

## 2019-08-14 RX ADMIN — MULTIPLE VITAMINS W/ MINERALS TAB SCH TAB: TAB at 08:49

## 2019-08-14 RX ADMIN — DOCUSATE SODIUM SCH MG: 100 CAPSULE, LIQUID FILLED ORAL at 21:32

## 2019-08-14 RX ADMIN — ACETAMINOPHEN PRN MG: 325 TABLET ORAL at 02:21

## 2019-08-14 RX ADMIN — POLYVINYL ALCOHOL SCH DROP: 14 SOLUTION/ DROPS OPHTHALMIC at 08:52

## 2019-08-14 RX ADMIN — FLECAINIDE ACETATE SCH MG: 50 TABLET ORAL at 21:00

## 2019-08-14 RX ADMIN — FLECAINIDE ACETATE SCH MG: 50 TABLET ORAL at 08:51

## 2019-08-14 RX ADMIN — FLECAINIDE ACETATE SCH MG: 50 TABLET ORAL at 21:31

## 2019-08-14 RX ADMIN — DIVALPROEX SODIUM SCH MG: 250 TABLET, DELAYED RELEASE ORAL at 08:50

## 2019-08-14 RX ADMIN — PRIMIDONE SCH MG: 50 TABLET ORAL at 21:30

## 2019-08-14 RX ADMIN — ATORVASTATIN CALCIUM SCH MG: 10 TABLET, FILM COATED ORAL at 21:30

## 2019-08-14 RX ADMIN — FOLIC ACID SCH MG: 1 TABLET ORAL at 21:31

## 2019-08-14 RX ADMIN — SPIRONOLACTONE SCH MG: 25 TABLET, FILM COATED ORAL at 08:51

## 2019-08-14 NOTE — REP
CHEST X-RAY:  Two views.

 

HISTORY:  Post pacemaker implant.

 

COMPARISON CHEST X-RAY:  August 13, 2019.

 

FINDINGS:  A bipolar pacemaker is seen in the right heart via the left side.

Skin staples are seen adjacent to the power plant.  EKG electrodes are noted.

There is no evidence of pneumothorax or hydrothorax.  Mild cardiomegaly is again

noted.  Lung fields are clear.

 

IMPRESSION: Status post pacemaker insertion.  No complication seen.

 

 

Electronically Signed by

Kishor Grayson MD 08/14/2019 08:58 P

## 2019-08-14 NOTE — IPNPDOC
Subjective


Date Seen


The patient was seen on 8/14/19.





Subjective


Chief Complaint/HPI


Does not offer any complains this morning. Says slept well . Denies any pain in 

the surgical site though the arm is immobilized at present. No fever or chills, 

no chest pain or sob





Objective


Physical Examination


General Exam:  Positive: Alert, Cooperative, No Acute Distress, Other (the 

patient was minimally responsive, and so we started talking about food that she 

likes)


Eye Exam:  Positive: PERRLA, Conjunctiva & lids normal


ENT Exam:  Positive: Atraumatic, Other ENT (dry oral mucosa)


Neck Exam:  Positive: Supple


Chest Exam:  Positive: Clear to auscultation, Normal air movement


Heart Exam:  Positive: Irregular Rhythm, Normal S1, Normal S2; 


   Negative: Gallops, Murmurs, Rubs


Telemetry:  Positive: Atrial fibrillation


Abdomen Exam:  Positive: Normal bowel sounds, Soft, Other (central obesity); 


   Negative: Tenderness, Hepatospenomegaly


Extremity Exam:  Positive: Edema, Normal pulses, Swelling; 


   Negative: Clubbing, Cyanosis


Skin Exam:  Positive: Nl turgor and temperature, Other skin issue (patient has a

number of skin tags around her neck); 


   Negative: Breakdown, Lesion


Neuro Exam:  Positive: Cranial Nerves 3-12 NL


Psych Exam:  Positive: Other (oriented to self and family)





Assessment /Plan


Assessment


This is an 89-year-old female with dementia, bed bound , hilda lift from [facility] 

presented  with a five-day history of worsening dyspnea, poor appetite and 

confusion. She was found to have a urinary tract infection and atrial 

fibrillation with rapid ventricular response (RVR).





Intermittent high degree A-V block/ sinus pause with rate controlling agents for

Afib. 


s/p dual chamber PPM on 8/13/19





Paroxysmal Atrial fibrillation with a rapid ventricular response. 


then sinus pauses with rate controlling agents. 


on metoprolol with intermittent bradycardia and a collapse in 2018, first degree

heart block, sinus pauses in telemetry now.


s/p PPM 


Now on Bisoprolol and flecainide. 


will restart eliquis on 8/15


 


Urinary tract infection with prior history of Proteus mirabilis in urine


culture, on ceftriaxone. CT shows bilateral renal calculi with mild right-sided


hydronephrosis. Normal renal functions. 


refer to urology as outpatient. 





Acute metabolic encephalopathy secondary to urinary tract infection (UTI) and


atrial fibrillation with rapid ventricular response (RVR). CT of the head shows


no acute CVA, chronic brain changes with encephalomalacia.  She does have 

history of brain tumor surgery in the remote past.  No acute


intracranial hemorrhage or nonhemorrhagic event on this CT.


 


Obesity, body mass index (BMI) of 38.8, at risk for hypercarbia. Monitor for


further mental status changes.


 


Parkinson's disease, polymyalgia rheumatica and rheumatoid arthritis. Patient


is Hilda lift at baseline. She is on a modified diet for aspiration risk.


continue prednisone, sinemet, primidone





dementia


continue home meds 





Hyperlipidemia


continue statin. 





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  





Diastolic CHF


acute on chronic due to Afib with rvr. 


continue torsemide and spironolactone. 





Status post deep venous thrombosis complicated with pulmonary emboli. 


She has been on chronic oral anticoagulation.  





Hypertrophic cardiomyopathy 


probably longstanding obesity has led to her left ventricle hypertrophy,


left ventricular diastolic dysfunction and left atrial enlargement as well as


contributing to her pulmonary hypertension.





Plan/VTE


VTE Prophylaxis Ordered?:  Yes (the patient is chronically on Eliquis)





Plan


IVF:  Initiate


Diet:  Continue Current (requires ground texture)


Activity:  Bedrest (mobilize by Hilda lift to wheelchair)


Medications:  Bowel Regimen, Start Antibiotics


Diagnostics:  Repeat Labs in AM, Obtain Cultures


Anticipated Discharge:  Nursing Home





VS, I&O, 24H, [facility]


Vital Signs/I&O





Vital Signs








  Date Time  Temp Pulse Resp B/P (MAP) Pulse Ox O2 Delivery O2 Flow Rate FiO2


 


8/14/19 08:51  85  127/60    


 


8/14/19 08:00 96.7  18  94   


 


8/14/19 04:00       2.0 


 


8/11/19 17:34      Room Air  














I&O- Last 24 Hours up to 6 AM 


 


 8/14/19





 06:00


 


Intake Total 1600 ml


 


Output Total 1325 ml


 


Balance 275 ml











Laboratory Data


24H LABS


Laboratory Tests 2


8/14/19 04:43: 


Anion Gap 10, Glomerular Filtration Rate > 60.0, Blood Urea Nitrogen 21H, Creati

nine 0.72, Sodium Level 136, Potassium Level 4.4, Chloride Level 103, Carbon 

Dioxide Level 23, Calcium Level 8.5L, Aspartate Amino Transf (AST/SGOT) 180H, 

Alanine Aminotransferase (ALT/SGPT) 14, Alkaline Phosphatase 139H, Total 

Bilirubin 0.3, Total Protein 5.8L, Albumin 1.4L, Troponin I < 0.02, Albu

min/Globulin Ratio 0.32L


8/14/19 05:57: Nucleated Red Blood Cells % (auto) 0.2H


CBC/BMP


Laboratory Tests


8/14/19 04:43








Calcium Level 8.5 L, Aspartate Amino Transf (AST/SGOT) 180 H, Alanine 

Aminotransferase (ALT/SGPT) 14, Alkaline Phosphatase 139 H, Total Bilirubin 0.3,

Total Protein 5.8 L, Albumin 1.4 L





8/14/19 05:57








Red Blood Count 2.50 L, Mean Corpuscular Volume 107.6 H, Mean Corpuscular 

Hemoglobin 34.4 H, Mean Corpuscular Hemoglobin Concent 32.0, Red Cell 

Distribution Width 16.6 H








Microbiology





Microbiology


8/11/19 Blood Culture - Preliminary, Resulted


          No Growth after 48 hours. All Specime...


8/11/19 Blood Culture - Preliminary, Resulted


          No Growth after 48 hours. All Specime...











JULIANN MOTA MD                   Aug 14, 2019 11:09

## 2019-08-15 VITALS — SYSTOLIC BLOOD PRESSURE: 130 MMHG | DIASTOLIC BLOOD PRESSURE: 60 MMHG

## 2019-08-15 VITALS — DIASTOLIC BLOOD PRESSURE: 62 MMHG | SYSTOLIC BLOOD PRESSURE: 130 MMHG

## 2019-08-15 VITALS — DIASTOLIC BLOOD PRESSURE: 74 MMHG | SYSTOLIC BLOOD PRESSURE: 124 MMHG

## 2019-08-15 LAB
BASOPHILS # BLD AUTO: 0 10^3/UL (ref 0–0.2)
BASOPHILS NFR BLD AUTO: 0.2 % (ref 0–1)
BUN SERPL-MCNC: 16 MG/DL (ref 7–18)
CALCIUM SERPL-MCNC: 7.9 MG/DL (ref 8.8–10.2)
CHLORIDE SERPL-SCNC: 104 MEQ/L (ref 98–107)
CO2 SERPL-SCNC: 26 MEQ/L (ref 21–32)
CREAT SERPL-MCNC: 0.62 MG/DL (ref 0.55–1.3)
EOSINOPHIL # BLD AUTO: 0.1 10^3/UL (ref 0–0.5)
EOSINOPHIL NFR BLD AUTO: 0.7 % (ref 0–3)
GFR SERPL CREATININE-BSD FRML MDRD: > 60 ML/MIN/{1.73_M2} (ref 32–?)
GLUCOSE SERPL-MCNC: 89 MG/DL (ref 70–100)
HCT VFR BLD AUTO: 28.5 % (ref 36–47)
HGB BLD-MCNC: 9.2 G/DL (ref 12–15.5)
LYMPHOCYTES # BLD AUTO: 1.3 10^3/UL (ref 1.5–4.5)
LYMPHOCYTES NFR BLD AUTO: 11 % (ref 24–44)
MCH RBC QN AUTO: 35.5 PG (ref 27–33)
MCHC RBC AUTO-ENTMCNC: 32.3 G/DL (ref 32–36.5)
MCV RBC AUTO: 110 FL (ref 80–96)
MONOCYTES # BLD AUTO: 0.8 10^3/UL (ref 0–0.8)
MONOCYTES NFR BLD AUTO: 6.9 % (ref 0–5)
NEUTROPHILS # BLD AUTO: 9.5 10^3/UL (ref 1.8–7.7)
NEUTROPHILS NFR BLD AUTO: 79.1 % (ref 36–66)
PLATELET # BLD AUTO: 407 10^3/UL (ref 150–450)
POTASSIUM SERPL-SCNC: 3.6 MEQ/L (ref 3.5–5.1)
RBC # BLD AUTO: 2.59 10^6/UL (ref 4–5.4)
SODIUM SERPL-SCNC: 140 MEQ/L (ref 136–145)
WBC # BLD AUTO: 12 10^3/UL (ref 4–10)

## 2019-08-15 RX ADMIN — CARBIDOPA AND LEVODOPA SCH TAB: 25; 100 TABLET ORAL at 09:59

## 2019-08-15 RX ADMIN — POLYVINYL ALCOHOL SCH DROP: 14 SOLUTION/ DROPS OPHTHALMIC at 09:00

## 2019-08-15 RX ADMIN — DOCUSATE SODIUM SCH MG: 100 CAPSULE, LIQUID FILLED ORAL at 10:00

## 2019-08-15 RX ADMIN — FERROUS SULFATE TAB 325 MG (65 MG ELEMENTAL FE) SCH MG: 325 (65 FE) TAB at 10:00

## 2019-08-15 RX ADMIN — PRIMIDONE SCH MG: 50 TABLET ORAL at 09:58

## 2019-08-15 RX ADMIN — SODIUM CHLORIDE, PRESERVATIVE FREE SCH ML: 5 INJECTION INTRAVENOUS at 05:06

## 2019-08-15 RX ADMIN — MULTIPLE VITAMINS W/ MINERALS TAB SCH TAB: TAB at 09:58

## 2019-08-15 RX ADMIN — DIVALPROEX SODIUM SCH MG: 250 TABLET, DELAYED RELEASE ORAL at 10:00

## 2019-08-15 RX ADMIN — FLECAINIDE ACETATE SCH MG: 50 TABLET ORAL at 10:01

## 2019-08-15 RX ADMIN — LORATADINE SCH MG: 10 TABLET ORAL at 10:00

## 2019-08-15 RX ADMIN — SPIRONOLACTONE SCH MG: 25 TABLET, FILM COATED ORAL at 09:00

## 2019-08-15 RX ADMIN — TORSEMIDE SCH MG: 20 TABLET ORAL at 09:59

## 2019-08-15 NOTE — IPN
CARDIOLOGY PROGRESS NOTE

 

DATE OF SERVICE:  08/14/2019

 

SUBJECTIVE:

The patient reports only minimal incisional discomfort.  Remains free of any

awareness of her heart action.  Denies shortness of breath on her limited

activity.

 

OBJECTIVE:

Pleasant obese elderly lady lying comfortably in bed.  Heart rate 78 BPM and

regular.  Blood pressure 114/60. Respiratory rate 18.  O2 saturation 94% on room

air.  Afebrile.  No pallor or cyanosis.  Trachea midline.  Increased

anteroposterior chest diameter with reduced chest excursion, but currently normal

air entry over both upper lobes.  Heart sounds were now regular and she remains

free of murmur.  Her pacemaker incision appears to be healing well.  No erythema,

swelling or discharge.  Dressing was changed today.

 

CHEST X-RAY:

Today, she had an AP and  left lateral chest x-ray.  With an anteroposterior

technique, her heart size is accentuated, but remains enlarged.  Pacing leads

remained stable in position with pulse generator left subclavian region.  Lung

fields were clear without infiltrate or pleural effusion.

 

EKG:

Tracing taken today shows atrially paced rhythm at 89 beats per minute (AF

suppression pacing was in effect) with first-degree AV block and spontaneous AV

conduction.  Isolated PVC.  Low voltage, leftward axis and marginal ST/T wave

abnormalities unchanged.  As noted above, her rhythm is now atrial fibrillation

from prior tracing 08/11/2019 atrial fibrillation.

 

BLOOD WORK:

Hemoglobin today was 10.6 with stable white blood cell count of 13,000, platelet

count remains normal.  Electrolytes were in balance with BUN 21, creatinine 0.7,

fasting blood sugar 96.  Curiously, SGOT has slightly increased as has alkaline

phosphatase.  Total bilirubin is normal as is SGPT.  Her albumin remains

remarkably low at 1.4.

 

IMPRESSION/PLAN:

1.  Atrial fibrillation - paroxysmal:  We have started her on bisoprolol 5 mg

daily with flecainide antiarrhythmic therapy in hopes of preventing recurrent

paroxysms.  With her AF suppression mode on, she actually paces relatively

quickly, as well as in the ventricle, so we have actually inactivated this pacing

mode and increased her flecainide slightly.  I am cautiously optimistic this will

help prevent recurrent episodes.  She will restart on her Eliquis tomorrow

morning.

2.  AV block (unspecified)/dual-chamber pacemaker in situ:  Complete pacemaker

interrogation was performed today showing excellent intracardiac electrograms and

pacing thresholds.  Have changed her program as mentioned above.  She will

continue DDD with low rate set at 60, AV delay at 150 milliseconds sensed, 200

milliseconds paced, but we have activated the intrinsic conduction search

allowing an additional 200 milliseconds to prevent ventricular pacing if

possible.  Our plan is to schedule a followup appointment for a clinic visit and

staple removal in 7-10 days.  In the interim, she should not have her incision

get wet.

3.  Heart failure (diastolic/acute on chronic):  With prior diuretic therapy and

restoration of a sinus mechanism, clinical and radiographic signs of congestion

have resolved.  Currently remains on a modest salt and fluid intake restriction

with torsemide and potassium chloride.

4.  Abnormal EKG:  Remains free of symptomatic myocardial ischemia.  Will remain

on protective combination bisoprolol, Eliquis and low-dose atorvastatin.

5.  Hypertrophic cardiomyopathy secondary to either condition:  Undoubtedly due

to her longstanding obesity.  Current blood pressure remains normal on low-dose

bisoprolol and her diuretic therapy.

 

From our standpoint she could be transferred back to the [facility]

tomorrow.

## 2019-08-16 NOTE — DS.PDOC
Discharge Summary


General


Date of Admission


Aug 11, 2019 at 14:57


Date of Discharge


08/15/19





Discharge Summary


PROCEDURES PERFORMED DURING STAY: Dual chamber Permanent pacemaker on 8/13/19





DISCHARGE DIAGNOSES:


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


Paroxysmal Afib with RVR 


Acute on chronic diastolic CHF


Urinary tract infection


Right hydronephrosis with renal stone


Acute metabolic encephalopathy


Hypertension


Hyperlipidemia


Obesity


Hypertrophic cardiomyopathy


Pulmonary hypertension


Dementia


H/o DVT and pulmonary hypertension


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


Parkinson disease


Polymyalgia rheumatica


Rheumatoid arthritis


Functional Quadriparesis WC bound, hilda lift. 





COMPLICATIONS/CHIEF COMPLAINT: Chronic A Fib W/Rvr; Uti.





HISTORY OF PRESENT ILLNESS: see history and physical





HOSPITAL COURSE: This is an 89-year-old female with dementia, bed bound , hilda 

lift from [facility] presented  with a five-day history of worsening dyspnea, poor 

appetite and confusion. She was found to have a urinary tract infection and 

atrial fibrillation with rapid ventricular response (RVR). During control of the

atrial fibrillation with A-V blocking agents she had developed high degree AV 

blocks and pauses noted in telemetry so underwent permanent pacemaker placement.







Intermittent high degree A-V block/ sinus pause with rate controlling agents for

Afib. 


s/p dual chamber PPM on 8/13/19





Paroxysmal Atrial fibrillation with a rapid ventricular response. 


then sinus pauses with rate controlling agents. 


on metoprolol with intermittent bradycardia and a collapse in 2018, first degree

heart block, sinus pauses in telemetry now.


s/p PPM 


Now on Bisoprolol and flecainide. 


restart eliquis on 8/15


 


Urinary tract infection with prior history of Proteus mirabilis in urine


culture, on ceftriaxone. CT shows bilateral renal calculi with mild right-sided


hydronephrosis. Normal renal functions. 


refer to urology as outpatient. 





Acute metabolic encephalopathy secondary to urinary tract infection (UTI) and


atrial fibrillation with rapid ventricular response (RVR). CT of the head shows


no acute CVA, chronic brain changes with encephalomalacia.  She does have 

history of brain tumor surgery in the remote past.  No acute


intracranial hemorrhage or nonhemorrhagic event on this CT.


 


Obesity, body mass index (BMI) of 38.8, at risk for hypercarbia. Monitor for


further mental status changes.


 


Parkinson's disease, polymyalgia rheumatica and rheumatoid arthritis. Patient


is Hilda lift at baseline. She is on a modified diet for aspiration risk.


continue prednisone, sinemet, primidone





dementia


continue home meds 





Hyperlipidemia


continue statin. 





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  





Diastolic CHF


acute on chronic due to Afib with rvr. 


continue torsemide and spironolactone. 





Status post deep venous thrombosis complicated with pulmonary emboli. 


She has been on chronic oral anticoagulation.  





Hypertrophic cardiomyopathy 


probably longstanding obesity has led to her left ventricle hypertrophy,


left ventricular diastolic dysfunction and left atrial enlargement as well as


contributing to her pulmonary hypertension.





DISCHARGE MEDICATIONS: Please see below.


 


ALLERGIES: Please see below.





PHYSICAL EXAMINATION ON DISCHARGE:


VITAL SIGNS: Please see below.


 General Exam:  Positive: Alert, Cooperative, No Acute Distress, Other (the 

patient was minimally responsive, and so we started talking about food that she 

likes)


Eye Exam:  Positive: PERRLA, Conjunctiva & lids normal


ENT Exam:  Positive: Atraumatic, Other ENT (dry oral mucosa)


Neck Exam:  Positive: Supple


Chest Exam:  Positive: Clear to auscultation, Normal air movement


Heart Exam:  Positive: Irregular Rhythm, Normal S1, Normal S2; 


   Negative: Gallops, Murmurs, Rubs


Telemetry:  Positive: Atrial fibrillation


Abdomen Exam:  Positive: Normal bowel sounds, Soft, Other (central obesity); 


   Negative: Tenderness, Hepatospenomegaly


Extremity Exam:  Positive: Edema, Normal pulses, Swelling; 


   Negative: Clubbing, Cyanosis


Skin Exam:  Positive: Nl turgor and temperature, Other skin issue (patient has a

number of skin tags around her neck); 


   Negative: Breakdown, Lesion


Neuro Exam:  Positive: Cranial Nerves 3-12 NL


Psych Exam:  Positive: Other (oriented to self and family)





LABORATORY DATA: Please see below.





ACTIVITY: [As tolerated].





DIET: DASH, fluid restriction 1.5 liters. 





DISPOSITION: [facility].





DISCHARGE INSTRUCTIONS:


Follow up Dr Ceballos in 1 week


Referral to Urology for right hydronephrosis. 





DISCHARGE CONDITION: [Stable].





TIME SPENT ON DISCHARGE: 35 minutes.





Vital Signs/I&Os





Vital Signs








  Date Time  Temp Pulse Resp B/P (MAP) Pulse Ox O2 Delivery O2 Flow Rate FiO2


 


8/15/19 09:58  73  124/74    


 


8/15/19 08:00 97.7  18  97   


 


8/14/19 04:00       2.0 


 


8/11/19 17:34      Room Air  














I&O- Last 24 Hours up to 6 AM 


 


 8/16/19





 06:00


 


Intake Total 360 ml


 


Balance 360 ml











Laboratory Data


CBC/BMP











Item Value  Date Time


 


White Blood Count 12.0 10^3/uL H 8/15/19 0417


 


Red Blood Count 2.59 10^6/uL L 8/15/19 0417


 


Hemoglobin 9.2 g/dl L 8/15/19 0417


 


Hematocrit 28.5 % L 8/15/19 0417


 


Mean Corpuscular Volume 110.0 fl H 8/15/19 0417


 


Mean Corpuscular Hemoglobin 35.5 pg H 8/15/19 0417


 


Mean Corpuscular Hemoglobin Concent 32.3 g/dl 8/15/19 0417


 


Red Cell Distribution Width 17.0 % H 8/15/19 0417


 


Platelet Count 407 10^3/uL 8/15/19 0417


 


Immature Granulocyte % (Auto) 2.1 % 8/15/19 0417


 


Neutrophils (%) (Auto) 79.1 % H 8/15/19 0417


 


Lymphocytes (%) (Auto) 11.0 % L 8/15/19 0417


 


Monocytes (%) (Auto) 6.9 % H 8/15/19 0417


 


Eosinophils (%) (Auto) 0.7 % 8/15/19 0417


 


Basophils (%) (Auto) 0.2 % 8/15/19 0417


 


Neutrophils # (Auto) 9.5 10^3/uL H 8/15/19 0417


 


Lymphocytes # (Auto) 1.3 10^3/uL L 8/15/19 0417


 


Monocytes # (Auto) 0.8 10^3/uL 8/15/19 0417


 


Eosinophils # (Auto) 0.1 10^3/uL 8/15/19 0417


 


Basophils # (Auto) 0.0 10^3/uL 8/15/19 0417


 


Nucleated Red Blood Cells % (auto) 0.2 % H 8/15/19 0417


 


Sodium Level 140 MEQ/L 8/15/19 0417


 


Potassium Level 3.6 MEQ/L 8/15/19 0417


 


Chloride Level 104 MEQ/L 8/15/19 0417


 


Carbon Dioxide Level 26 MEQ/L 8/15/19 0417


 


Anion Gap 10 MEQ/L 8/15/19 0417


 


Blood Urea Nitrogen 16 MG/DL 8/15/19 0417


 


Creatinine 0.62 MG/DL 8/15/19 0417


 


Glomerular Filtration Rate > 60.0 8/15/19 0417


 


Fasting Glucose 89 MG/DL 8/15/19 0417


 


Calcium Level 7.9 MG/DL L 8/15/19 0417











Microbiology





Microbiology


8/11/19 Blood Culture - Preliminary, Resulted


          No Growth after 72 hours. All specime...


8/11/19 Blood Culture - Preliminary, Resulted


          No Growth after 72 hours. All specime...





Discharge Medications


Scheduled


Acetaminophen (Acetaminophen ER) 650 Mg Tablet.er, 650 MG PO BID, (Reported)


Apixaban (Eliquis) 5 Mg Tablet, 5 MG PO BID, (Reported)


Atorvastatin Calcium (Atorvastatin Calcium) 10 Mg Tablet, 10 MG PO QHS, 

(Reported)


Bisoprolol Fumarate (Bisoprolol Fumarate) 5 Mg Tablet, 5 MG PO DAILY


Carbidopa/Levodopa (Carbidopa-Levodopa  Tab) 1 Each Tablet, 1.5 TAB PO 

QID, (Reported)


   0800/1200/1600/2000 


Carboxymethylcellulose Sodium (Refresh Tears) 15 Ml Drops, 1 DROP OU BID, 

(Reported)


Cefpodoxime Proxetil (Cefpodoxime Proxetil) 100 Mg Tablet, 100 MG PO BID, 

(Reported)


   x 3 DAYS, START 8/10/19-8/13/19 


Divalproex Sodium (Depakote) 250 Mg Tablet.dr, 250 MG PO BID, (Reported)


Docusate Sodium (Docusate Sodium) 100 Mg Capsule, 200 MG PO BID, (Reported)


Escitalopram Oxalate (Escitalopram Oxalate) 10 Mg Tablet, 10 MG PO QHS, 

(Reported)


Ferrous Sulfate (Ferrous Sulfate) 325 Mg Tablet, 325 MG PO DAILY, (Reported)


Flecainide Acetate (Flecainide Acetate) 50 Mg Tablet, 75 MG PO Q12H


Folic Acid (Folic Acid) 1 Mg Tablet, 1 MG PO QHS, (Reported)


Loratadine (Loratadine) 10 Mg Tab.rapdis, 10 MG PO DAILY, (Reported)


Methotrexate Sodium (Methotrexate) 2.5 Mg Tablet, 12.5 MG PO QWEEK, (Reported)


   WEDNESDAYS 


Multivitamins (Thera M Plus Tablet) 1 Each Tablet, 1 TAB PO DAILY, (Reported)


Potassium Chloride (Potassium Chloride) 20 Meq Tab.er.prt, 20 MEQ PO BID, 

(Reported)


Prednisone (Prednisone) 1 Mg Tablet, 2 MG PO DAILY, (Reported)


Primidone (Primidone) 50 Mg Tablet, 200 MG PO DAILY, (Reported)


Primidone (Primidone) 50 Mg Tablet, 150 MG PO QHS, (Reported)


Spironolactone (Aldactone) 25 Mg Tablet, 12.5 MG PO QAM


Torsemide (Torsemide) 20 Mg Tablet, 40 MG PO BID@09,17





Scheduled PRN


Acetaminophen (Acetaminophen) 325 Mg Tablet, 650 MG PO Q4H PRN for PAIN, 

(Reported)


Acetaminophen (Acetaminophen) 650 Mg Supp.rect, 650 MG NC Q4H PRN for PAIN / 

FEVER, (Reported)


Capsaicin (Capsaicin) 0.025% Cream..g., 1 APLCT TOP TID PRN for DISCOMFORT, 

(Reported)


   APPLY TO LOWER EXTREMITIES 


Levalbuterol Hydrochloride (Xopenex Hfa) 15 Gm Hfa.aer.ad, 1 PUFF INH QID PRN 

for SHORTNESS OF BREATH, (Reported)


Magnesium Hydroxide (Milk of Magnesia) 400 Mg/5 Ml Oral.susp, 2,400 MG PO DAILY 

PRN for CONSTIPATION, (Reported)


Sodium Phosphate,Mono-Dibasic (Enema) 133 Ml Enema, 1 MAGGY NC DAILY PRN for 

CONSTIPATION, (Reported)





Allergies


Coded Allergies:  


     No Known Drug Allergies (Verified  Allergy, Unknown, 8/11/19)











JULIANN MOTA MD                   Aug 16, 2019 06:34

## 2019-08-17 NOTE — ECGEPIP
[facility]

                                       

                                       Test Date:    2019

Pat Name:     MINA VERA      Department:   

Patient ID:   [id number]                 Room:         [place] [address]

Gender:       Female                   Technician:   UVALDO

:          1930               Requested By: Francesco Ceballos 

Order Number: [id number]     Reading MD:   Ashok Florence

                                 Measurements

Intervals                              Axis          

Rate:         75                       P:            129

AR:           223                      QRS:          -74

QRSD:         133                      T:            93

QT:           419                                    

QTc:          470                                    

                           Interpretive Statements

ELECTRONIC ATRIAL-VENTRICULAR PACEMAKER

FUSION BEATS

Compared to prior tracings in the system on 19, Atrial paced noted

Electronically Signed on 2019 15:29:26 EDT by Ashok Florence

## 2019-08-20 ENCOUNTER — HOSPITAL ENCOUNTER (OUTPATIENT)
Dept: HOSPITAL 53 - SSVSNF4 | Age: 84
End: 2019-08-20
Attending: NURSE PRACTITIONER
Payer: MEDICARE

## 2019-08-20 DIAGNOSIS — I50.9: Primary | ICD-10-CM

## 2019-08-20 LAB
BUN SERPL-MCNC: 11 MG/DL (ref 7–18)
CALCIUM SERPL-MCNC: 8.2 MG/DL (ref 8.8–10.2)
CHLORIDE SERPL-SCNC: 99 MEQ/L (ref 98–107)
CO2 SERPL-SCNC: 28 MEQ/L (ref 21–32)
CREAT SERPL-MCNC: 0.67 MG/DL (ref 0.55–1.3)
GFR SERPL CREATININE-BSD FRML MDRD: > 60 ML/MIN/{1.73_M2} (ref 32–?)
GLUCOSE SERPL-MCNC: 77 MG/DL (ref 70–100)
HCT VFR BLD AUTO: 34.6 % (ref 36–47)
HGB BLD-MCNC: 10.9 G/DL (ref 12–15.5)
MCH RBC QN AUTO: 34.4 PG (ref 27–33)
MCHC RBC AUTO-ENTMCNC: 31.5 G/DL (ref 32–36.5)
MCV RBC AUTO: 109.1 FL (ref 80–96)
PLATELET # BLD AUTO: 377 10^3/UL (ref 150–450)
POTASSIUM SERPL-SCNC: 4.3 MEQ/L (ref 3.5–5.1)
RBC # BLD AUTO: 3.17 10^6/UL (ref 4–5.4)
SODIUM SERPL-SCNC: 138 MEQ/L (ref 136–145)
WBC # BLD AUTO: 13 10^3/UL (ref 4–10)

## 2019-08-26 ENCOUNTER — HOSPITAL ENCOUNTER (OUTPATIENT)
Dept: HOSPITAL 53 - SSVSNF4 | Age: 84
End: 2019-08-26
Attending: INTERNAL MEDICINE
Payer: MEDICARE

## 2019-08-26 DIAGNOSIS — N39.0: Primary | ICD-10-CM

## 2019-08-26 LAB
HCT VFR BLD AUTO: 36.2 % (ref 36–47)
HGB BLD-MCNC: 11.5 G/DL (ref 12–15.5)
MCH RBC QN AUTO: 35.1 PG (ref 27–33)
MCHC RBC AUTO-ENTMCNC: 31.8 G/DL (ref 32–36.5)
MCV RBC AUTO: 110.4 FL (ref 80–96)
PLATELET # BLD AUTO: 353 10^3/UL (ref 150–450)
RBC # BLD AUTO: 3.28 10^6/UL (ref 4–5.4)
WBC # BLD AUTO: 9.3 10^3/UL (ref 4–10)

## 2019-08-27 ENCOUNTER — HOSPITAL ENCOUNTER (OUTPATIENT)
Dept: HOSPITAL 53 - SSVSNF4 | Age: 84
End: 2019-08-27
Attending: NURSE PRACTITIONER
Payer: MEDICARE

## 2019-08-27 DIAGNOSIS — I50.9: Primary | ICD-10-CM

## 2019-08-27 LAB
BUN SERPL-MCNC: 8 MG/DL (ref 7–18)
CALCIUM SERPL-MCNC: 7.8 MG/DL (ref 8.8–10.2)
CHLORIDE SERPL-SCNC: 100 MEQ/L (ref 98–107)
CO2 SERPL-SCNC: 26 MEQ/L (ref 21–32)
CREAT SERPL-MCNC: 0.51 MG/DL (ref 0.55–1.3)
GFR SERPL CREATININE-BSD FRML MDRD: > 60 ML/MIN/{1.73_M2} (ref 32–?)
GLUCOSE SERPL-MCNC: 82 MG/DL (ref 70–100)
HCT VFR BLD AUTO: 31.3 % (ref 36–47)
HGB BLD-MCNC: 9.8 G/DL (ref 12–15.5)
MCH RBC QN AUTO: 33 PG (ref 27–33)
MCHC RBC AUTO-ENTMCNC: 31.3 G/DL (ref 32–36.5)
MCV RBC AUTO: 105.4 FL (ref 80–96)
PLATELET # BLD AUTO: 361 10^3/UL (ref 150–450)
POTASSIUM SERPL-SCNC: 3.7 MEQ/L (ref 3.5–5.1)
RBC # BLD AUTO: 2.97 10^6/UL (ref 4–5.4)
SODIUM SERPL-SCNC: 140 MEQ/L (ref 136–145)
WBC # BLD AUTO: 9.1 10^3/UL (ref 4–10)

## 2019-09-03 ENCOUNTER — HOSPITAL ENCOUNTER (OUTPATIENT)
Dept: HOSPITAL 53 - SSVSNF4 | Age: 84
End: 2019-09-03
Attending: NURSE PRACTITIONER
Payer: MEDICARE

## 2019-09-03 DIAGNOSIS — I50.9: Primary | ICD-10-CM

## 2019-09-03 LAB
BUN SERPL-MCNC: 13 MG/DL (ref 7–18)
CALCIUM SERPL-MCNC: 7.9 MG/DL (ref 8.8–10.2)
CHLORIDE SERPL-SCNC: 100 MEQ/L (ref 98–107)
CO2 SERPL-SCNC: 27 MEQ/L (ref 21–32)
CREAT SERPL-MCNC: 0.62 MG/DL (ref 0.55–1.3)
GFR SERPL CREATININE-BSD FRML MDRD: > 60 ML/MIN/{1.73_M2} (ref 32–?)
GLUCOSE SERPL-MCNC: 123 MG/DL (ref 70–100)
HCT VFR BLD AUTO: 29.3 % (ref 36–47)
HGB BLD-MCNC: 9.2 G/DL (ref 12–15.5)
MCH RBC QN AUTO: 33.9 PG (ref 27–33)
MCHC RBC AUTO-ENTMCNC: 31.4 G/DL (ref 32–36.5)
MCV RBC AUTO: 108.1 FL (ref 80–96)
PLATELET # BLD AUTO: 234 10^3/UL (ref 150–450)
POTASSIUM SERPL-SCNC: 4.2 MEQ/L (ref 3.5–5.1)
RBC # BLD AUTO: 2.71 10^6/UL (ref 4–5.4)
SODIUM SERPL-SCNC: 138 MEQ/L (ref 136–145)
WBC # BLD AUTO: 5.6 10^3/UL (ref 4–10)

## 2019-09-10 ENCOUNTER — HOSPITAL ENCOUNTER (OUTPATIENT)
Dept: HOSPITAL 53 - SSVSNF4 | Age: 84
End: 2019-09-10
Attending: NURSE PRACTITIONER
Payer: MEDICARE

## 2019-09-10 DIAGNOSIS — I50.9: Primary | ICD-10-CM

## 2019-09-10 LAB
BUN SERPL-MCNC: 9 MG/DL (ref 7–18)
CALCIUM SERPL-MCNC: 7.8 MG/DL (ref 8.8–10.2)
CHLORIDE SERPL-SCNC: 102 MEQ/L (ref 98–107)
CO2 SERPL-SCNC: 27 MEQ/L (ref 21–32)
CREAT SERPL-MCNC: 0.42 MG/DL (ref 0.55–1.3)
FERRITIN SERPL-MCNC: 247 NG/ML (ref 8–252)
FOLATE SERPL-MCNC: 12.9 NG/ML
GFR SERPL CREATININE-BSD FRML MDRD: > 60 ML/MIN/{1.73_M2} (ref 32–?)
GLUCOSE SERPL-MCNC: 85 MG/DL (ref 70–100)
HCT VFR BLD AUTO: 21.3 % (ref 36–47)
HGB BLD-MCNC: 6.7 G/DL (ref 12–15.5)
IRON SATN MFR SERPL: 30.8 % (ref 13.2–45)
IRON SERPL-MCNC: 48 UG/DL (ref 50–170)
MCH RBC QN AUTO: 33 PG (ref 27–33)
MCHC RBC AUTO-ENTMCNC: 31.5 G/DL (ref 32–36.5)
MCV RBC AUTO: 104.9 FL (ref 80–96)
PLATELET # BLD AUTO: 229 10^3/UL (ref 150–450)
POTASSIUM SERPL-SCNC: 3.7 MEQ/L (ref 3.5–5.1)
RBC # BLD AUTO: 2.03 10^6/UL (ref 4–5.4)
SODIUM SERPL-SCNC: 139 MEQ/L (ref 136–145)
TIBC SERPL-MCNC: 156 UG/DL (ref 250–450)
VIT B12 SERPL-MCNC: 705 PG/ML
WBC # BLD AUTO: 5.5 10^3/UL (ref 4–10)

## 2019-09-11 ENCOUNTER — HOSPITAL ENCOUNTER (OUTPATIENT)
Dept: HOSPITAL 53 - SSVSNF4 | Age: 84
End: 2019-09-11
Attending: INTERNAL MEDICINE
Payer: MEDICARE

## 2019-09-11 ENCOUNTER — HOSPITAL ENCOUNTER (OUTPATIENT)
Dept: HOSPITAL 53 - M INFU | Age: 84
Discharge: HOME | End: 2019-09-11
Attending: NURSE PRACTITIONER
Payer: MEDICARE

## 2019-09-11 VITALS — DIASTOLIC BLOOD PRESSURE: 78 MMHG | SYSTOLIC BLOOD PRESSURE: 130 MMHG

## 2019-09-11 VITALS — SYSTOLIC BLOOD PRESSURE: 97 MMHG | DIASTOLIC BLOOD PRESSURE: 54 MMHG

## 2019-09-11 VITALS — DIASTOLIC BLOOD PRESSURE: 63 MMHG | SYSTOLIC BLOOD PRESSURE: 137 MMHG

## 2019-09-11 VITALS — HEIGHT: 68 IN | WEIGHT: 256.62 LBS | BODY MASS INDEX: 38.89 KG/M2

## 2019-09-11 VITALS — SYSTOLIC BLOOD PRESSURE: 117 MMHG | DIASTOLIC BLOOD PRESSURE: 56 MMHG

## 2019-09-11 VITALS — DIASTOLIC BLOOD PRESSURE: 51 MMHG | SYSTOLIC BLOOD PRESSURE: 102 MMHG

## 2019-09-11 VITALS — DIASTOLIC BLOOD PRESSURE: 54 MMHG | SYSTOLIC BLOOD PRESSURE: 97 MMHG

## 2019-09-11 VITALS — SYSTOLIC BLOOD PRESSURE: 137 MMHG | DIASTOLIC BLOOD PRESSURE: 63 MMHG

## 2019-09-11 VITALS — DIASTOLIC BLOOD PRESSURE: 72 MMHG | SYSTOLIC BLOOD PRESSURE: 127 MMHG

## 2019-09-11 VITALS — SYSTOLIC BLOOD PRESSURE: 117 MMHG | DIASTOLIC BLOOD PRESSURE: 58 MMHG

## 2019-09-11 VITALS — DIASTOLIC BLOOD PRESSURE: 58 MMHG | SYSTOLIC BLOOD PRESSURE: 117 MMHG

## 2019-09-11 DIAGNOSIS — Z12.11: ICD-10-CM

## 2019-09-11 DIAGNOSIS — D64.9: Primary | ICD-10-CM

## 2019-09-11 PROCEDURE — 82270 OCCULT BLOOD FECES: CPT

## 2019-09-11 PROCEDURE — 36430 TRANSFUSION BLD/BLD COMPNT: CPT

## 2019-09-12 ENCOUNTER — HOSPITAL ENCOUNTER (OUTPATIENT)
Dept: HOSPITAL 53 - SSVSNF4 | Age: 84
End: 2019-09-12
Attending: INTERNAL MEDICINE
Payer: MEDICARE

## 2019-09-12 DIAGNOSIS — D64.9: Primary | ICD-10-CM

## 2019-09-12 LAB
HCT VFR BLD AUTO: 30.3 % (ref 36–47)
HGB BLD-MCNC: 10 G/DL (ref 12–15.5)
MCH RBC QN AUTO: 31.9 PG (ref 27–33)
MCHC RBC AUTO-ENTMCNC: 33 G/DL (ref 32–36.5)
MCV RBC AUTO: 96.8 FL (ref 80–96)
PLATELET # BLD AUTO: 240 10^3/UL (ref 150–450)
RBC # BLD AUTO: 3.13 10^6/UL (ref 4–5.4)
WBC # BLD AUTO: 11.1 10^3/UL (ref 4–10)

## 2019-09-16 ENCOUNTER — HOSPITAL ENCOUNTER (OUTPATIENT)
Dept: HOSPITAL 53 - SSVSNF4 | Age: 84
End: 2019-09-16
Attending: INTERNAL MEDICINE
Payer: MEDICARE

## 2019-09-16 DIAGNOSIS — D64.9: Primary | ICD-10-CM

## 2019-09-16 LAB
HCT VFR BLD AUTO: 31.9 % (ref 36–47)
HGB BLD-MCNC: 10.2 G/DL (ref 12–15.5)
MCH RBC QN AUTO: 32.7 PG (ref 27–33)
MCHC RBC AUTO-ENTMCNC: 32 G/DL (ref 32–36.5)
MCV RBC AUTO: 102.2 FL (ref 80–96)
PLATELET # BLD AUTO: 224 10^3/UL (ref 150–450)
RBC # BLD AUTO: 3.12 10^6/UL (ref 4–5.4)
WBC # BLD AUTO: 4.7 10^3/UL (ref 4–10)

## 2019-09-23 ENCOUNTER — HOSPITAL ENCOUNTER (OUTPATIENT)
Dept: HOSPITAL 53 - SSVSNF4 | Age: 84
End: 2019-09-23
Attending: NURSE PRACTITIONER
Payer: MEDICARE

## 2019-09-23 DIAGNOSIS — D64.9: Primary | ICD-10-CM

## 2019-09-23 LAB
HCT VFR BLD AUTO: 27.2 % (ref 36–47)
HGB BLD-MCNC: 8.4 G/DL (ref 12–15.5)
MCH RBC QN AUTO: 31.5 PG (ref 27–33)
MCHC RBC AUTO-ENTMCNC: 30.9 G/DL (ref 32–36.5)
MCV RBC AUTO: 101.9 FL (ref 80–96)
PLATELET # BLD AUTO: 360 10^3/UL (ref 150–450)
RBC # BLD AUTO: 2.67 10^6/UL (ref 4–5.4)
WBC # BLD AUTO: 7.5 10^3/UL (ref 4–10)

## 2019-10-02 ENCOUNTER — HOSPITAL ENCOUNTER (OUTPATIENT)
Dept: HOSPITAL 53 - SSVSNF4 | Age: 84
End: 2019-10-02
Attending: NURSE PRACTITIONER
Payer: MEDICARE

## 2019-10-02 DIAGNOSIS — I48.91: Primary | ICD-10-CM

## 2019-10-02 LAB
HCT VFR BLD AUTO: 21.5 % (ref 36–47)
HGB BLD-MCNC: 6.7 G/DL (ref 12–15.5)
MCH RBC QN AUTO: 29.1 PG (ref 27–33)
MCHC RBC AUTO-ENTMCNC: 31.2 G/DL (ref 32–36.5)
MCV RBC AUTO: 93.5 FL (ref 80–96)
PLATELET # BLD AUTO: 605 10^3/UL (ref 150–450)
RBC # BLD AUTO: 2.3 10^6/UL (ref 4–5.4)
WBC # BLD AUTO: 8.4 10^3/UL (ref 4–10)

## 2019-10-03 ENCOUNTER — HOSPITAL ENCOUNTER (OUTPATIENT)
Dept: HOSPITAL 53 - M INFU | Age: 84
End: 2019-10-03
Attending: NURSE PRACTITIONER
Payer: MEDICARE

## 2019-10-03 VITALS — DIASTOLIC BLOOD PRESSURE: 70 MMHG | SYSTOLIC BLOOD PRESSURE: 147 MMHG

## 2019-10-03 VITALS — SYSTOLIC BLOOD PRESSURE: 144 MMHG | DIASTOLIC BLOOD PRESSURE: 70 MMHG

## 2019-10-03 VITALS — DIASTOLIC BLOOD PRESSURE: 58 MMHG | SYSTOLIC BLOOD PRESSURE: 123 MMHG

## 2019-10-03 VITALS — SYSTOLIC BLOOD PRESSURE: 139 MMHG | DIASTOLIC BLOOD PRESSURE: 70 MMHG

## 2019-10-03 VITALS — DIASTOLIC BLOOD PRESSURE: 65 MMHG | SYSTOLIC BLOOD PRESSURE: 150 MMHG

## 2019-10-03 VITALS — DIASTOLIC BLOOD PRESSURE: 75 MMHG | SYSTOLIC BLOOD PRESSURE: 140 MMHG

## 2019-10-03 VITALS — SYSTOLIC BLOOD PRESSURE: 137 MMHG | DIASTOLIC BLOOD PRESSURE: 66 MMHG

## 2019-10-03 VITALS — SYSTOLIC BLOOD PRESSURE: 137 MMHG | DIASTOLIC BLOOD PRESSURE: 93 MMHG

## 2019-10-03 VITALS — BODY MASS INDEX: 38.89 KG/M2 | HEIGHT: 68 IN | WEIGHT: 256.62 LBS

## 2019-10-03 VITALS — DIASTOLIC BLOOD PRESSURE: 70 MMHG | SYSTOLIC BLOOD PRESSURE: 155 MMHG

## 2019-10-03 DIAGNOSIS — D64.9: Primary | ICD-10-CM

## 2019-10-03 PROCEDURE — 86901 BLOOD TYPING SEROLOGIC RH(D): CPT

## 2019-10-03 PROCEDURE — 86900 BLOOD TYPING SEROLOGIC ABO: CPT

## 2019-10-03 PROCEDURE — 86850 RBC ANTIBODY SCREEN: CPT

## 2019-10-03 PROCEDURE — 86920 COMPATIBILITY TEST SPIN: CPT

## 2019-10-03 PROCEDURE — 36430 TRANSFUSION BLD/BLD COMPNT: CPT

## 2019-10-04 ENCOUNTER — HOSPITAL ENCOUNTER (OUTPATIENT)
Dept: HOSPITAL 53 - SSVSNF4 | Age: 84
End: 2019-10-04
Attending: NURSE PRACTITIONER
Payer: MEDICARE

## 2019-10-04 DIAGNOSIS — D64.9: Primary | ICD-10-CM

## 2019-10-04 LAB
HCT VFR BLD AUTO: 29.7 % (ref 36–47)
HGB BLD-MCNC: 9.7 G/DL (ref 12–15.5)
MCH RBC QN AUTO: 29 PG (ref 27–33)
MCHC RBC AUTO-ENTMCNC: 32.7 G/DL (ref 32–36.5)
MCV RBC AUTO: 88.9 FL (ref 80–96)
PLATELET # BLD AUTO: 482 10^3/UL (ref 150–450)
RBC # BLD AUTO: 3.34 10^6/UL (ref 4–5.4)
WBC # BLD AUTO: 9.7 10^3/UL (ref 4–10)

## 2019-10-07 ENCOUNTER — HOSPITAL ENCOUNTER (OUTPATIENT)
Dept: HOSPITAL 53 - SSVSNF4 | Age: 84
End: 2019-10-07
Attending: NURSE PRACTITIONER
Payer: MEDICARE

## 2019-10-07 DIAGNOSIS — I50.9: Primary | ICD-10-CM

## 2019-10-07 LAB
HCT VFR BLD AUTO: 29 % (ref 36–47)
HGB BLD-MCNC: 9.2 G/DL (ref 12–15.5)
MCH RBC QN AUTO: 29.3 PG (ref 27–33)
MCHC RBC AUTO-ENTMCNC: 31.7 G/DL (ref 32–36.5)
MCV RBC AUTO: 92.4 FL (ref 80–96)
PLATELET # BLD AUTO: 260 10^3/UL (ref 150–450)
RBC # BLD AUTO: 3.14 10^6/UL (ref 4–5.4)
WBC # BLD AUTO: 6.1 10^3/UL (ref 4–10)

## 2019-10-11 ENCOUNTER — HOSPITAL ENCOUNTER (OUTPATIENT)
Dept: HOSPITAL 53 - SSVSNF4 | Age: 84
End: 2019-10-11
Attending: INTERNAL MEDICINE
Payer: MEDICARE

## 2019-10-11 DIAGNOSIS — D64.9: Primary | ICD-10-CM

## 2019-10-11 LAB
HCT VFR BLD AUTO: 25.3 % (ref 36–47)
HGB BLD-MCNC: 8.1 G/DL (ref 12–15.5)
MCH RBC QN AUTO: 29.1 PG (ref 27–33)
MCHC RBC AUTO-ENTMCNC: 32 G/DL (ref 32–36.5)
MCV RBC AUTO: 91 FL (ref 80–96)
PLATELET # BLD AUTO: 170 10^3/UL (ref 150–450)
RBC # BLD AUTO: 2.78 10^6/UL (ref 4–5.4)
WBC # BLD AUTO: 7.9 10^3/UL (ref 4–10)

## 2019-10-15 ENCOUNTER — HOSPITAL ENCOUNTER (OUTPATIENT)
Dept: HOSPITAL 53 - SSVSNF4 | Age: 84
End: 2019-10-15
Attending: NURSE PRACTITIONER
Payer: MEDICARE

## 2019-10-15 DIAGNOSIS — D64.9: Primary | ICD-10-CM

## 2019-10-15 LAB
HCT VFR BLD AUTO: 23.4 % (ref 36–47)
HGB BLD-MCNC: 7.5 G/DL (ref 12–15.5)
MCH RBC QN AUTO: 29.1 PG (ref 27–33)
MCHC RBC AUTO-ENTMCNC: 32.1 G/DL (ref 32–36.5)
MCV RBC AUTO: 90.7 FL (ref 80–96)
PLATELET # BLD AUTO: 210 10^3/UL (ref 150–450)
RBC # BLD AUTO: 2.58 10^6/UL (ref 4–5.4)
WBC # BLD AUTO: 4.8 10^3/UL (ref 4–10)